# Patient Record
Sex: FEMALE | Race: WHITE | NOT HISPANIC OR LATINO | URBAN - NONMETROPOLITAN AREA
[De-identification: names, ages, dates, MRNs, and addresses within clinical notes are randomized per-mention and may not be internally consistent; named-entity substitution may affect disease eponyms.]

---

## 2021-01-14 ENCOUNTER — IMMUNIZATION (OUTPATIENT)
Dept: VACCINE CLINIC | Facility: HOSPITAL | Age: 66
End: 2021-01-14

## 2021-01-14 PROCEDURE — 0002A: CPT | Performed by: FAMILY MEDICINE

## 2021-01-14 PROCEDURE — 0001A: CPT | Performed by: FAMILY MEDICINE

## 2021-01-14 PROCEDURE — 91300 HC SARSCOV02 VAC 30MCG/0.3ML IM: CPT | Performed by: FAMILY MEDICINE

## 2021-02-04 ENCOUNTER — IMMUNIZATION (OUTPATIENT)
Dept: VACCINE CLINIC | Facility: HOSPITAL | Age: 66
End: 2021-02-04

## 2021-02-04 PROCEDURE — 91300 HC SARSCOV02 VAC 30MCG/0.3ML IM: CPT | Performed by: INTERNAL MEDICINE

## 2021-02-04 PROCEDURE — 0002A: CPT | Performed by: INTERNAL MEDICINE

## 2021-10-05 ENCOUNTER — IMMUNIZATION (OUTPATIENT)
Dept: VACCINE CLINIC | Facility: HOSPITAL | Age: 66
End: 2021-10-05

## 2021-10-05 PROCEDURE — 0003A: CPT | Performed by: INTERNAL MEDICINE

## 2021-10-05 PROCEDURE — 91300 HC SARSCOV02 VAC 30MCG/0.3ML IM: CPT | Performed by: INTERNAL MEDICINE

## 2021-10-05 PROCEDURE — 0004A ADM SARSCOV2 30MCG/0.3ML BOOSTER: CPT | Performed by: INTERNAL MEDICINE

## 2024-10-08 DIAGNOSIS — R10.9 FLANK PAIN: Primary | ICD-10-CM

## 2024-10-10 ENCOUNTER — HOSPITAL ENCOUNTER (OUTPATIENT)
Dept: GENERAL RADIOLOGY | Facility: HOSPITAL | Age: 69
Discharge: HOME | End: 2024-10-10
Admitting: UROLOGY
Payer: MEDICARE

## 2024-10-10 ENCOUNTER — OFFICE VISIT (OUTPATIENT)
Dept: UROLOGY | Facility: CLINIC | Age: 69
End: 2024-10-10
Payer: COMMERCIAL

## 2024-10-10 VITALS
WEIGHT: 139.6 LBS | SYSTOLIC BLOOD PRESSURE: 136 MMHG | BODY MASS INDEX: 27.41 KG/M2 | DIASTOLIC BLOOD PRESSURE: 78 MMHG | HEIGHT: 60 IN | HEART RATE: 66 BPM

## 2024-10-10 DIAGNOSIS — R35.0 FREQUENCY OF URINATION: ICD-10-CM

## 2024-10-10 DIAGNOSIS — R10.9 FLANK PAIN: ICD-10-CM

## 2024-10-10 DIAGNOSIS — R11.0 NAUSEA WITHOUT VOMITING: ICD-10-CM

## 2024-10-10 DIAGNOSIS — N20.0 RENAL CALCULUS, RIGHT: Primary | ICD-10-CM

## 2024-10-10 DIAGNOSIS — R10.9 RIGHT FLANK PAIN: Primary | ICD-10-CM

## 2024-10-10 DIAGNOSIS — R10.30 LOWER ABDOMINAL PAIN: ICD-10-CM

## 2024-10-10 DIAGNOSIS — R10.9 RIGHT FLANK PAIN: ICD-10-CM

## 2024-10-10 DIAGNOSIS — K59.04 CHRONIC IDIOPATHIC CONSTIPATION: ICD-10-CM

## 2024-10-10 LAB
BILIRUB BLD-MCNC: NEGATIVE MG/DL
CLARITY, POC: CLEAR
COLOR UR: YELLOW
EXPIRATION DATE: NORMAL
GLUCOSE UR STRIP-MCNC: NEGATIVE MG/DL
KETONES UR QL: NEGATIVE
LEUKOCYTE EST, POC: NEGATIVE
Lab: NORMAL
NITRITE UR-MCNC: NEGATIVE MG/ML
PH UR: 6 [PH] (ref 5–8)
PROT UR STRIP-MCNC: NEGATIVE MG/DL
RBC # UR STRIP: NEGATIVE /UL
SP GR UR: 1.01 (ref 1–1.03)
UROBILINOGEN UR QL: NORMAL

## 2024-10-10 PROCEDURE — 99204 OFFICE O/P NEW MOD 45 MIN: CPT | Performed by: NURSE PRACTITIONER

## 2024-10-10 PROCEDURE — 74018 RADEX ABDOMEN 1 VIEW: CPT

## 2024-10-10 PROCEDURE — 81003 URINALYSIS AUTO W/O SCOPE: CPT | Performed by: NURSE PRACTITIONER

## 2024-10-10 RX ORDER — EVOLOCUMAB 140 MG/ML
140 INJECTION, SOLUTION SUBCUTANEOUS
COMMUNITY
Start: 2024-08-01

## 2024-10-10 RX ORDER — HYDROCHLOROTHIAZIDE 25 MG/1
25 TABLET ORAL DAILY
COMMUNITY
Start: 2024-07-15

## 2024-10-10 RX ORDER — KETOROLAC TROMETHAMINE 10 MG/1
10 TABLET, FILM COATED ORAL EVERY 6 HOURS PRN
Qty: 20 TABLET | Refills: 0 | Status: SHIPPED | OUTPATIENT
Start: 2024-10-10

## 2024-10-10 RX ORDER — TAMSULOSIN HYDROCHLORIDE 0.4 MG/1
1 CAPSULE ORAL DAILY
Qty: 30 CAPSULE | Refills: 5 | Status: SHIPPED | OUTPATIENT
Start: 2024-10-10

## 2024-10-10 RX ORDER — LEVOTHYROXINE SODIUM 25 UG/1
25 TABLET ORAL DAILY
COMMUNITY
Start: 2024-09-19

## 2024-10-10 RX ORDER — METOPROLOL SUCCINATE 50 MG/1
50 TABLET, EXTENDED RELEASE ORAL DAILY
COMMUNITY
Start: 2024-03-19 | End: 2025-03-19

## 2024-10-10 RX ORDER — ONDANSETRON 4 MG/1
4 TABLET, FILM COATED ORAL EVERY 12 HOURS PRN
Qty: 20 TABLET | Refills: 0 | Status: SHIPPED | OUTPATIENT
Start: 2024-10-10

## 2024-10-10 NOTE — H&P (VIEW-ONLY)
Chief Complaint  RIGHT RENAL STONE/FLANK/BACK/ABD PAIN /IBS-C (NEW PATIENT)    Subjective          Liat Mcginnis presents to [facility] for RIGHT RENAL STONE/FLANK/BACK/ABD PAIN /IBS-C  History of Present Illness   History of Present Illness  The patient is a pleasant 68-year-old female  presenting today for evaluation as a new patient consult.  She has been referred to clinic by PCP with concerns of right renal stone lower back and flank pain.  She is also here for an ED follow-up, initially evaluated on 09/11/2024 where she had a CT abdomen and pelvis completed showing a 7 mm intrarenal stone with left parapelvic renal cyst..     On clinic evaluation today, she is in no apparent discomfort. Nevertheless, patient reports that she has been diagnosed with a large kidney stone, her first occurrence. She has been taking calcium supplements for approximately 7 years and hydrochlorothiazide for a couple of years. She experiences occasional lower back pain and had an episode of frequent urination every 15 minutes for about a week, which has since improved. She reports difficulty in fully emptying her bladder and has not noticed any blood in her urine. She has not undergone any bladder surgeries or experienced abnormal vaginal bleeding. Her last pelvic exam was in 08/2023.  Urinalysis in clinic today is complete negative for leukocyte esterase, it is negative for nitrites, it is negative for gross/microscopic hematuria.  Her PVR is 0 mL.    She is not on any blood thinners but started a new medication this summer for her heart and recent cardiac dysrhythmia. She has experienced episodes of bigeminy and has moderate aortic vascular calcifications. She takes Repatha for cholesterol management and maintains a healthy diet. She has never taken medication until she turned 64. She experiences nausea and abdominal pain but does not take any medication for the nausea.    She has  been experiencing constipation and has been advised to take fiber gummies. It has been some time since her last colonoscopy. Despite her constipation, she maintains a good appetite and eats 2 to 3 meals a day. She has a scheduled appointment with a gastroenterologist.    She had knee surgery in the past. Her knee kept buckling after the surgery despite doing all the recommended exercises and being careful. One day, while cooking, she turned quickly and her knee buckled, causing her to fall. An older gentleman provided immediate assistance by giving her a shot, straightening her knee, and putting it in a brace. She then went to  and had surgery the next morning, where she received a monique. She exercises and walks regularly. Yesterday, she trimmed shrubs and did various other activities. She was off all week and was trying to get things done. She wishes she had never had knee surgery, which was due to an old sports injury.    SOCIAL HISTORY  She is a retired teacher.    FAMILY HISTORY  She denies any family history of colon problems, colon cancer, bladder cancer, or uterine cancers. Her mother, father, and all her brothers have high cholesterol.    Also recommend follow-up with OB/GYN with pelvic/transvaginal ultrasound secondary to calcification in pelvis consistent with uterine fibroid    FINDINGS:XR Abdomen KUB 10/10/24  1. View of the abdomen .Moderate to large stool burden  Large calcification projecting over the right kidney.Calcification in the pelvis, likely uterine calcification.    CT Abdomen With & Without Contrast 09/11/2024  1. Nonobstructing 7 mm right renal pelvis stone.There are no stones in the left kidney. There are no ureteral stones   2. There is no intrarenal stone or hydronephrosis.  3. There are parapelvic cysts involving the left kidney.  4. There is no solid renal mass.  5. Small hiatal hernia.  There is a 5 PMHx as noted in chart  Active Ambulatory Problems     Diagnosis Date Noted    Renal  "calculus, right 10/10/2024    Right flank pain 10/10/2024    Frequency of urination 10/10/2024    Lower abdominal pain 10/10/2024    Chronic idiopathic constipation 10/10/2024    Nausea without vomiting 10/10/2024     Resolved Ambulatory Problems     Diagnosis Date Noted    No Resolved Ambulatory Problems     No Additional Past Medical History      Objective   Vital Signs:   /78 (BP Location: Left arm, Patient Position: Sitting, Cuff Size: Adult)   Pulse 66   Ht 152.4 cm (60\")   Wt 63.3 kg (139 lb 9.6 oz)   BMI 27.26 kg/m²       ROS:   Review of Systems   Constitutional:  Positive for appetite change and fatigue. Negative for activity change, chills, diaphoresis, fever, unexpected weight gain and unexpected weight loss.   HENT: Negative.  Negative for congestion.    Eyes: Negative.  Negative for blurred vision, double vision, photophobia, pain, redness and visual disturbance.   Respiratory: Negative.  Negative for apnea, cough, chest tightness, shortness of breath and wheezing.    Cardiovascular: Negative.    Gastrointestinal:  Positive for abdominal distention, abdominal pain and constipation. Negative for diarrhea, nausea and vomiting.   Endocrine: Negative.  Negative for polydipsia, polyphagia and polyuria.   Genitourinary:  Positive for difficulty urinating, dysuria, flank pain, frequency, pelvic pain, pelvic pressure and urgency. Negative for decreased urine volume, dyspareunia, genital sores, hematuria, urinary incontinence and vaginal discharge.   Musculoskeletal:  Positive for back pain and joint swelling (Post recent knee surgery). Negative for arthralgias.   Skin:  Positive for dry skin and pallor. Negative for rash and wound.   Allergic/Immunologic: Negative.    Neurological: Negative.  Negative for dizziness, headache, memory problem and confusion.   Hematological: Negative.    Psychiatric/Behavioral:  Positive for sleep disturbance and stress. Negative for behavioral problems and decreased " concentration.         Physical Exam  Constitutional:       General: She is in acute distress.      Appearance: She is well-developed. She is obese.   HENT:      Head: Normocephalic and atraumatic.   Eyes:      Pupils: Pupils are equal, round, and reactive to light.   Neck:      Thyroid: No thyromegaly.      Trachea: No tracheal deviation.   Cardiovascular:      Rate and Rhythm: Normal rate and regular rhythm.      Heart sounds: No murmur heard.  Pulmonary:      Effort: Pulmonary effort is normal. No respiratory distress.      Breath sounds: Normal breath sounds. No stridor. No wheezing.   Abdominal:      General: Bowel sounds are normal. There is distension.      Palpations: Abdomen is soft.      Tenderness: There is abdominal tenderness. There is guarding.   Genitourinary:     Labia:         Right: No tenderness.         Left: No tenderness.       Vagina: Normal. No vaginal discharge.      Comments: Detrusor instability to urine frequency, urgency without any incontinence  Musculoskeletal:         General: Tenderness present. No deformity. Normal range of motion.      Cervical back: Normal range of motion.   Skin:     General: Skin is warm and dry.      Capillary Refill: Capillary refill takes less than 2 seconds.      Coloration: Skin is pale.      Findings: No erythema or rash.   Neurological:      Mental Status: She is alert and oriented to person, place, and time.      Cranial Nerves: No cranial nerve deficit.      Sensory: No sensory deficit.      Motor: Weakness present.      Coordination: Coordination normal.      Gait: Gait abnormal.   Psychiatric:         Behavior: Behavior normal.         Thought Content: Thought content normal.         Judgment: Judgment normal.        Physical Exam    Result Review :               10/10/2024    11:24   Urinalysis   Ketones, UA Negative    Leukocytes, UA Negative               Assessment and Plan    Problem List Items Addressed This Visit          Gastrointestinal  Abdominal     Right flank pain    Relevant Medications    ketorolac (TORADOL) 10 MG tablet    tamsulosin (FLOMAX) 0.4 MG capsule 24 hr capsule    Other Relevant Orders    Case Request (Completed)    Lower abdominal pain    Relevant Medications    linaclotide (Linzess) 145 MCG capsule capsule    magnesium citrate solution    ondansetron (Zofran) 4 MG tablet    ketorolac (TORADOL) 10 MG tablet    tamsulosin (FLOMAX) 0.4 MG capsule 24 hr capsule    Other Relevant Orders    Case Request (Completed)    Chronic idiopathic constipation    Relevant Medications    linaclotide (Linzess) 145 MCG capsule capsule    magnesium citrate solution    ondansetron (Zofran) 4 MG tablet    Nausea without vomiting    Relevant Medications    ondansetron (Zofran) 4 MG tablet       Genitourinary and Reproductive     Renal calculus, right - Primary    Relevant Medications    hydroCHLOROthiazide 25 MG tablet    ondansetron (Zofran) 4 MG tablet    ketorolac (TORADOL) 10 MG tablet    tamsulosin (FLOMAX) 0.4 MG capsule 24 hr capsule    Other Relevant Orders    Case Request (Completed)    Frequency of urination    Relevant Orders    POC Urinalysis Dipstick, Automated (Completed)     Assessment & Plan      ASSESSMENT  RIGHT RENAL CALCULUS/FLANK/BACK/ABD PAIN/ACUTE CYSTITIS/IBS-C  Ms. Liat Mcginnis is a pleasant 68-year-old female who presents to clinic for evaluation with diagnosis of 7 mm right renal stone.  She does report persistent flank and abdominal pain with urinary symptoms of frequency urgency and dysuria, pelvic pressure and suprapubic discomfort that has been ongoing and recently becoming very bothersome to her.  She does not have recurrent UTIs and denies any bouts of gross hematuria.  Denies she is in clinic today is completely negative.  Repeat KUB does show 7 mm stone in situ, also moderate to large volume stool consistent with chronic idiopathic constipation.     The Patient has been diagnosed with 7 mm right enal calculus. We have  discussed the various parameters regarding spontaneous passage including the notion that a tiny 1-4 mm stone has a high likelihood of spontaneous passage versus a larger stones >7MM like he has being caught up in the upper areas of the urinary tract.   We also discussed the medical management of stone disease and the use of medical expulsive therapy in the form of Flomax. This is used in an off label setting. I also talked about nonoperative management including ambulation and increasing fluids and hot tub as being an effective adjuncts in the treatment of a stone. We discussed the absolute relative indicators for intervention including the presence of sepsis, and pain we cannot control as the primary need for urgent intervention.  We discussed placement of a stent if indicated and the management of the stent as well.       ESWL-the patient is a candidate for extracorporeal shockwave lithotripsy.  We discussed the type of stone.  And the complications associated with the procedure including but not limited to pain in the flank, hematoma, spontaneous renal hemorrhage, and adequate fragmentation of stones.  The need for passage of the stones.  The need for concomitant additional procedures in the range of 24% the risk of a distal fragment in the range of 3% requiring ureteroscopy. THE Patient is desirous of surgical intervention at this time.    IBS- Patient has significant irritable bowel syndrome, characterized by extreme amounts of constipation.  We spoke about the impact of this on bladder function.  We spoke about  its relationship to recurrent urinary tract infection, the abdominal pain, nausea, back pain, flank pain, pelvic pressure and discomfort that she is experiencing.  We discussed the need for increasing p.o. fluid intake to at least 2 to 3 L of water daily, and discussed the physiology of colonic motility as well as use of MiraLAX as a bulk laxative versus the newer class of serotonin uptake blockers  such as Linzess.  We stressed the need for a daily bowel movement and discussed the [place] stool scale at length. We also discussed a referral to the GI nurse practitioner -                                                  PLAN  The patient has been scheduled for Right Extracorporeal Shockwave Lithotripsy -ESWL on Friday,11/01/2024 with Dr. Saini.     GAVE patient a refill of pain medication-Toradol just in case and Nausea medication and Flomax for medical expulsive therapy     ENCOURAGED to continue conservative therapy, with increased ambulation, warm baths/showers, warm compresses to flank as tolerated.      We discussed OTHER treatment options for her flank pain with patient encouraged to continue conservative therapy alternating NSAID/Tylenol as tolerated, Also including hot baths, showers, warm compresses to lower back as tolerated. Also encouraged walking, physical therapy, light exercises as tolerated    Rest is the most important treatment in the case of flank pain. Rest and physical therapy are enough to improve minor pain. Discussed to monitor her daily routine with prevention of flank pain by: At least Drinking 8 glasses of water per day, Limiting your alcohol consumption.  Having a healthy diet containing fruits, vegetables, and a lot of fluids, Practicing safe sex.  Also maintaining proper hygiene of your body as well as the environment.    Samples Linzess 145 mg given to patient for bowel therapy    Also recommend magnesium citrate or daily MiraLAX    Also recommend follow-up with OB/GYN secondary to calcification in pelvis consistent with uterine fibroid    Follow up as discussed post surgery for kidney stone reevaluation.     May return sooner if need be      THE PATIENT IS AGREEABLE WITH PLAN OF CARE.     1. Right renal calculus./FLANK/BACK PAIN-SUMMARY  The patient's symptoms and imaging results are consistent with a diagnosis of right renal calculus. Shockwave lithotripsy is recommended for  the treatment of the renal calculus. An outpatient shockwave lithotripsy procedure has been scheduled for 11/01/2024. Post-procedure pain management will be provided, with Flomax highly recommended to aid in the passage of the stone. Zofran will be prescribed for nausea.    2. Constipation.  The patient's symptoms and history suggest a diagnosis of constipation. Linzess is recommended for the management of constipation. Samples of Linzess will be provided. Lactulose or magnesium citrate is recommended for colon cleansing. She is advised to increase her fiber intake and use MiraLAX at home, mixing three to five scoops and drinking it at nighttime over the weekend.    3. Possible calcified uterine fibroid.  A uterine ultrasound of the pelvis and uterus is recommended to investigate the possibility of a calcified uterine fibroid. She is advised to schedule this ultrasound with her OBGYN.    Patient reports that she is not currently experiencing any symptoms of urinary incontinence.      BMI is >= 25 and <30. (Overweight) The following options were offered after discussion;: weight loss educational material (shared in after visit summary), exercise counseling/recommendations, and nutrition counseling/recommendations      RADIOLOGY (CT AND/OR KUB):    CT Abdomen and Pelvis: No results found for this or any previous visit.     CT Stone Protocol: No results found for this or any previous visit.     KUB: Results for orders placed during the hospital encounter of 10/10/24    XR Abdomen KUB    Narrative  EXAMINATION: XR ABDOMEN KUB-    CLINICAL INDICATION: flank pain; R10.9-Unspecified abdominal pain      COMPARISON: None available    FINDINGS:  1. View of the abdomen    Moderate to large stool burden    Large calcification projecting over the right kidney.    Calcification in the pelvis, likely uterine calcification.      This report was finalized on 10/10/2024 10:28 AM by Dr. Micheal Deleon MD.       [unfilled]  LABS (3  MONTHS):    Office Visit on 10/10/2024   Component Date Value Ref Range Status    Color 10/10/2024 Yellow  Yellow, Straw, Dark Yellow, Cynthia Final    Clarity, UA 10/10/2024 Clear  Clear Final    Specific Gravity  10/10/2024 1.010  1.005 - 1.030 Final    pH, Urine 10/10/2024 6.0  5.0 - 8.0 Final    Leukocytes 10/10/2024 Negative  Negative Final    Nitrite, UA 10/10/2024 Negative  Negative Final    Protein, POC 10/10/2024 Negative  Negative mg/dL Final    Glucose, UA 10/10/2024 Negative  Negative mg/dL Final    Ketones, UA 10/10/2024 Negative  Negative Final    Urobilinogen, UA 10/10/2024 Normal  Normal, 0.2 E.U./dL Final    Bilirubin 10/10/2024 Negative  Negative Final    Blood, UA 10/10/2024 Negative  Negative Final    Lot Number 10/10/2024 [id number]   Final    Expiration Date 10/10/2024 10/25/2024   Final          Follow Up   Return in about 22 days (around 11/1/2024) for Next scheduled follow up, With Dr. Saini IN OR FOR RIGHT ESWL/FLANK/ABD PAIN.    Patient was given instructions and counseling regarding her condition or for health maintenance advice. Please see specific information pulled into the AVS if appropriate.          This document has been electronically signed by Griselda Cheng-Akwa, APRN   October 10, 2024 16:07 EDT      Dictated Utilizing Dragon Dictation: Part of this note may be an electronic transcription/translation of spoken language to printed text using the Dragon Dictation System.      Patient or patient representative verbalized consent for the use of Ambient Listening during the visit with  Griselda Cheng-Akwa, APRN for chart documentation. 10/10/2024  15:59 EDT

## 2024-10-10 NOTE — PROGRESS NOTES
Chief Complaint  RIGHT RENAL STONE/FLANK/BACK/ABD PAIN /IBS-C (NEW PATIENT)    Subjective          Liat Mcginnis presents to [facility] for RIGHT RENAL STONE/FLANK/BACK/ABD PAIN /IBS-C  History of Present Illness   History of Present Illness  The patient is a pleasant 68-year-old female  presenting today for evaluation as a new patient consult.  She has been referred to clinic by PCP with concerns of right renal stone lower back and flank pain.  She is also here for an ED follow-up, initially evaluated on 09/11/2024 where she had a CT abdomen and pelvis completed showing a 7 mm intrarenal stone with left parapelvic renal cyst..     On clinic evaluation today, she is in no apparent discomfort. Nevertheless, patient reports that she has been diagnosed with a large kidney stone, her first occurrence. She has been taking calcium supplements for approximately 7 years and hydrochlorothiazide for a couple of years. She experiences occasional lower back pain and had an episode of frequent urination every 15 minutes for about a week, which has since improved. She reports difficulty in fully emptying her bladder and has not noticed any blood in her urine. She has not undergone any bladder surgeries or experienced abnormal vaginal bleeding. Her last pelvic exam was in 08/2023.  Urinalysis in clinic today is complete negative for leukocyte esterase, it is negative for nitrites, it is negative for gross/microscopic hematuria.  Her PVR is 0 mL.    She is not on any blood thinners but started a new medication this summer for her heart and recent cardiac dysrhythmia. She has experienced episodes of bigeminy and has moderate aortic vascular calcifications. She takes Repatha for cholesterol management and maintains a healthy diet. She has never taken medication until she turned 64. She experiences nausea and abdominal pain but does not take any medication for the nausea.    She has  been experiencing constipation and has been advised to take fiber gummies. It has been some time since her last colonoscopy. Despite her constipation, she maintains a good appetite and eats 2 to 3 meals a day. She has a scheduled appointment with a gastroenterologist.    She had knee surgery in the past. Her knee kept buckling after the surgery despite doing all the recommended exercises and being careful. One day, while cooking, she turned quickly and her knee buckled, causing her to fall. An older gentleman provided immediate assistance by giving her a shot, straightening her knee, and putting it in a brace. She then went to  and had surgery the next morning, where she received a monique. She exercises and walks regularly. Yesterday, she trimmed shrubs and did various other activities. She was off all week and was trying to get things done. She wishes she had never had knee surgery, which was due to an old sports injury.    SOCIAL HISTORY  She is a retired teacher.    FAMILY HISTORY  She denies any family history of colon problems, colon cancer, bladder cancer, or uterine cancers. Her mother, father, and all her brothers have high cholesterol.    Also recommend follow-up with OB/GYN with pelvic/transvaginal ultrasound secondary to calcification in pelvis consistent with uterine fibroid    FINDINGS:XR Abdomen KUB 10/10/24  1. View of the abdomen .Moderate to large stool burden  Large calcification projecting over the right kidney.Calcification in the pelvis, likely uterine calcification.    CT Abdomen With & Without Contrast 09/11/2024  1. Nonobstructing 7 mm right renal pelvis stone.There are no stones in the left kidney. There are no ureteral stones   2. There is no intrarenal stone or hydronephrosis.  3. There are parapelvic cysts involving the left kidney.  4. There is no solid renal mass.  5. Small hiatal hernia.  There is a 5 PMHx as noted in chart  Active Ambulatory Problems     Diagnosis Date Noted    Renal  "calculus, right 10/10/2024    Right flank pain 10/10/2024    Frequency of urination 10/10/2024    Lower abdominal pain 10/10/2024    Chronic idiopathic constipation 10/10/2024    Nausea without vomiting 10/10/2024     Resolved Ambulatory Problems     Diagnosis Date Noted    No Resolved Ambulatory Problems     No Additional Past Medical History      Objective   Vital Signs:   /78 (BP Location: Left arm, Patient Position: Sitting, Cuff Size: Adult)   Pulse 66   Ht 152.4 cm (60\")   Wt 63.3 kg (139 lb 9.6 oz)   BMI 27.26 kg/m²       ROS:   Review of Systems   Constitutional:  Positive for appetite change and fatigue. Negative for activity change, chills, diaphoresis, fever, unexpected weight gain and unexpected weight loss.   HENT: Negative.  Negative for congestion.    Eyes: Negative.  Negative for blurred vision, double vision, photophobia, pain, redness and visual disturbance.   Respiratory: Negative.  Negative for apnea, cough, chest tightness, shortness of breath and wheezing.    Cardiovascular: Negative.    Gastrointestinal:  Positive for abdominal distention, abdominal pain and constipation. Negative for diarrhea, nausea and vomiting.   Endocrine: Negative.  Negative for polydipsia, polyphagia and polyuria.   Genitourinary:  Positive for difficulty urinating, dysuria, flank pain, frequency, pelvic pain, pelvic pressure and urgency. Negative for decreased urine volume, dyspareunia, genital sores, hematuria, urinary incontinence and vaginal discharge.   Musculoskeletal:  Positive for back pain and joint swelling (Post recent knee surgery). Negative for arthralgias.   Skin:  Positive for dry skin and pallor. Negative for rash and wound.   Allergic/Immunologic: Negative.    Neurological: Negative.  Negative for dizziness, headache, memory problem and confusion.   Hematological: Negative.    Psychiatric/Behavioral:  Positive for sleep disturbance and stress. Negative for behavioral problems and decreased " concentration.         Physical Exam  Constitutional:       General: She is in acute distress.      Appearance: She is well-developed. She is obese.   HENT:      Head: Normocephalic and atraumatic.   Eyes:      Pupils: Pupils are equal, round, and reactive to light.   Neck:      Thyroid: No thyromegaly.      Trachea: No tracheal deviation.   Cardiovascular:      Rate and Rhythm: Normal rate and regular rhythm.      Heart sounds: No murmur heard.  Pulmonary:      Effort: Pulmonary effort is normal. No respiratory distress.      Breath sounds: Normal breath sounds. No stridor. No wheezing.   Abdominal:      General: Bowel sounds are normal. There is distension.      Palpations: Abdomen is soft.      Tenderness: There is abdominal tenderness. There is guarding.   Genitourinary:     Labia:         Right: No tenderness.         Left: No tenderness.       Vagina: Normal. No vaginal discharge.      Comments: Detrusor instability to urine frequency, urgency without any incontinence  Musculoskeletal:         General: Tenderness present. No deformity. Normal range of motion.      Cervical back: Normal range of motion.   Skin:     General: Skin is warm and dry.      Capillary Refill: Capillary refill takes less than 2 seconds.      Coloration: Skin is pale.      Findings: No erythema or rash.   Neurological:      Mental Status: She is alert and oriented to person, place, and time.      Cranial Nerves: No cranial nerve deficit.      Sensory: No sensory deficit.      Motor: Weakness present.      Coordination: Coordination normal.      Gait: Gait abnormal.   Psychiatric:         Behavior: Behavior normal.         Thought Content: Thought content normal.         Judgment: Judgment normal.        Physical Exam    Result Review :               10/10/2024    11:24   Urinalysis   Ketones, UA Negative    Leukocytes, UA Negative               Assessment and Plan    Problem List Items Addressed This Visit          Gastrointestinal  Abdominal     Right flank pain    Relevant Medications    ketorolac (TORADOL) 10 MG tablet    tamsulosin (FLOMAX) 0.4 MG capsule 24 hr capsule    Other Relevant Orders    Case Request (Completed)    Lower abdominal pain    Relevant Medications    linaclotide (Linzess) 145 MCG capsule capsule    magnesium citrate solution    ondansetron (Zofran) 4 MG tablet    ketorolac (TORADOL) 10 MG tablet    tamsulosin (FLOMAX) 0.4 MG capsule 24 hr capsule    Other Relevant Orders    Case Request (Completed)    Chronic idiopathic constipation    Relevant Medications    linaclotide (Linzess) 145 MCG capsule capsule    magnesium citrate solution    ondansetron (Zofran) 4 MG tablet    Nausea without vomiting    Relevant Medications    ondansetron (Zofran) 4 MG tablet       Genitourinary and Reproductive     Renal calculus, right - Primary    Relevant Medications    hydroCHLOROthiazide 25 MG tablet    ondansetron (Zofran) 4 MG tablet    ketorolac (TORADOL) 10 MG tablet    tamsulosin (FLOMAX) 0.4 MG capsule 24 hr capsule    Other Relevant Orders    Case Request (Completed)    Frequency of urination    Relevant Orders    POC Urinalysis Dipstick, Automated (Completed)     Assessment & Plan      ASSESSMENT  RIGHT RENAL CALCULUS/FLANK/BACK/ABD PAIN/ACUTE CYSTITIS/IBS-C  Ms. Liat Mcginnis is a pleasant 68-year-old female who presents to clinic for evaluation with diagnosis of 7 mm right renal stone.  She does report persistent flank and abdominal pain with urinary symptoms of frequency urgency and dysuria, pelvic pressure and suprapubic discomfort that has been ongoing and recently becoming very bothersome to her.  She does not have recurrent UTIs and denies any bouts of gross hematuria.  Denies she is in clinic today is completely negative.  Repeat KUB does show 7 mm stone in situ, also moderate to large volume stool consistent with chronic idiopathic constipation.     The Patient has been diagnosed with 7 mm right enal calculus. We have  discussed the various parameters regarding spontaneous passage including the notion that a tiny 1-4 mm stone has a high likelihood of spontaneous passage versus a larger stones >7MM like he has being caught up in the upper areas of the urinary tract.   We also discussed the medical management of stone disease and the use of medical expulsive therapy in the form of Flomax. This is used in an off label setting. I also talked about nonoperative management including ambulation and increasing fluids and hot tub as being an effective adjuncts in the treatment of a stone. We discussed the absolute relative indicators for intervention including the presence of sepsis, and pain we cannot control as the primary need for urgent intervention.  We discussed placement of a stent if indicated and the management of the stent as well.       ESWL-the patient is a candidate for extracorporeal shockwave lithotripsy.  We discussed the type of stone.  And the complications associated with the procedure including but not limited to pain in the flank, hematoma, spontaneous renal hemorrhage, and adequate fragmentation of stones.  The need for passage of the stones.  The need for concomitant additional procedures in the range of 24% the risk of a distal fragment in the range of 3% requiring ureteroscopy. THE Patient is desirous of surgical intervention at this time.    IBS- Patient has significant irritable bowel syndrome, characterized by extreme amounts of constipation.  We spoke about the impact of this on bladder function.  We spoke about  its relationship to recurrent urinary tract infection, the abdominal pain, nausea, back pain, flank pain, pelvic pressure and discomfort that she is experiencing.  We discussed the need for increasing p.o. fluid intake to at least 2 to 3 L of water daily, and discussed the physiology of colonic motility as well as use of MiraLAX as a bulk laxative versus the newer class of serotonin uptake blockers  such as Linzess.  We stressed the need for a daily bowel movement and discussed the [place] stool scale at length. We also discussed a referral to the GI nurse practitioner -                                                  PLAN  The patient has been scheduled for Right Extracorporeal Shockwave Lithotripsy -ESWL on Friday,11/01/2024 with Dr. Saini.     GAVE patient a refill of pain medication-Toradol just in case and Nausea medication and Flomax for medical expulsive therapy     ENCOURAGED to continue conservative therapy, with increased ambulation, warm baths/showers, warm compresses to flank as tolerated.      We discussed OTHER treatment options for her flank pain with patient encouraged to continue conservative therapy alternating NSAID/Tylenol as tolerated, Also including hot baths, showers, warm compresses to lower back as tolerated. Also encouraged walking, physical therapy, light exercises as tolerated    Rest is the most important treatment in the case of flank pain. Rest and physical therapy are enough to improve minor pain. Discussed to monitor her daily routine with prevention of flank pain by: At least Drinking 8 glasses of water per day, Limiting your alcohol consumption.  Having a healthy diet containing fruits, vegetables, and a lot of fluids, Practicing safe sex.  Also maintaining proper hygiene of your body as well as the environment.    Samples Linzess 145 mg given to patient for bowel therapy    Also recommend magnesium citrate or daily MiraLAX    Also recommend follow-up with OB/GYN secondary to calcification in pelvis consistent with uterine fibroid    Follow up as discussed post surgery for kidney stone reevaluation.     May return sooner if need be      THE PATIENT IS AGREEABLE WITH PLAN OF CARE.     1. Right renal calculus./FLANK/BACK PAIN-SUMMARY  The patient's symptoms and imaging results are consistent with a diagnosis of right renal calculus. Shockwave lithotripsy is recommended for  the treatment of the renal calculus. An outpatient shockwave lithotripsy procedure has been scheduled for 11/01/2024. Post-procedure pain management will be provided, with Flomax highly recommended to aid in the passage of the stone. Zofran will be prescribed for nausea.    2. Constipation.  The patient's symptoms and history suggest a diagnosis of constipation. Linzess is recommended for the management of constipation. Samples of Linzess will be provided. Lactulose or magnesium citrate is recommended for colon cleansing. She is advised to increase her fiber intake and use MiraLAX at home, mixing three to five scoops and drinking it at nighttime over the weekend.    3. Possible calcified uterine fibroid.  A uterine ultrasound of the pelvis and uterus is recommended to investigate the possibility of a calcified uterine fibroid. She is advised to schedule this ultrasound with her OBGYN.    Patient reports that she is not currently experiencing any symptoms of urinary incontinence.      BMI is >= 25 and <30. (Overweight) The following options were offered after discussion;: weight loss educational material (shared in after visit summary), exercise counseling/recommendations, and nutrition counseling/recommendations      RADIOLOGY (CT AND/OR KUB):    CT Abdomen and Pelvis: No results found for this or any previous visit.     CT Stone Protocol: No results found for this or any previous visit.     KUB: Results for orders placed during the hospital encounter of 10/10/24    XR Abdomen KUB    Narrative  EXAMINATION: XR ABDOMEN KUB-    CLINICAL INDICATION: flank pain; R10.9-Unspecified abdominal pain      COMPARISON: None available    FINDINGS:  1. View of the abdomen    Moderate to large stool burden    Large calcification projecting over the right kidney.    Calcification in the pelvis, likely uterine calcification.      This report was finalized on 10/10/2024 10:28 AM by Dr. Micheal Deleon MD.       [unfilled]  LABS (3  MONTHS):    Office Visit on 10/10/2024   Component Date Value Ref Range Status    Color 10/10/2024 Yellow  Yellow, Straw, Dark Yellow, Cynthia Final    Clarity, UA 10/10/2024 Clear  Clear Final    Specific Gravity  10/10/2024 1.010  1.005 - 1.030 Final    pH, Urine 10/10/2024 6.0  5.0 - 8.0 Final    Leukocytes 10/10/2024 Negative  Negative Final    Nitrite, UA 10/10/2024 Negative  Negative Final    Protein, POC 10/10/2024 Negative  Negative mg/dL Final    Glucose, UA 10/10/2024 Negative  Negative mg/dL Final    Ketones, UA 10/10/2024 Negative  Negative Final    Urobilinogen, UA 10/10/2024 Normal  Normal, 0.2 E.U./dL Final    Bilirubin 10/10/2024 Negative  Negative Final    Blood, UA 10/10/2024 Negative  Negative Final    Lot Number 10/10/2024 [id number]   Final    Expiration Date 10/10/2024 10/25/2024   Final          Follow Up   Return in about 22 days (around 11/1/2024) for Next scheduled follow up, With Dr. Saini IN OR FOR RIGHT ESWL/FLANK/ABD PAIN.    Patient was given instructions and counseling regarding her condition or for health maintenance advice. Please see specific information pulled into the AVS if appropriate.          This document has been electronically signed by Griselda Cheng-Akwa, APRN   October 10, 2024 16:07 EDT      Dictated Utilizing Dragon Dictation: Part of this note may be an electronic transcription/translation of spoken language to printed text using the Dragon Dictation System.      Patient or patient representative verbalized consent for the use of Ambient Listening during the visit with  Griselda Cheng-Akwa, APRN for chart documentation. 10/10/2024  15:59 EDT

## 2024-10-29 NOTE — DISCHARGE INSTRUCTIONS
Please discontinue all blood thinners and anticoagulants (except aspirin) prior to surgery as per your surgeon and cardiologist instructions.  Aspirin may be continued up to the day prior to surgery.    HOLD all diabetic medications the morning of surgery as order by physician.    Please follow instructions on use of prep cloths provided by nurse. Return instruction sheet to pre-op nurse on day of surgery.    Arrival time for surgery on 11/1/24  will be given to you by 's   Office.    A RESPONSIBLE PERSON MUST REMAIN IN THE WAITING ROOM DURING YOUR PROCEDURE AND A RESPONSIBLE  MUST BE AVAILABLE UPON YOUR DISCHARGE.    General Instructions:  Do NOT eat or drink after midnight 10/31/24 which includes water, mints, or gum.  You may brush your teeth. Dental appliances that are removable must be taken out day of surgery.  Do NOT smoke, chew tobacco, or drink alcohol within 24 hours prior to surgery.  Bring medications in original bottles, any inhalers and if applicable your C-PAP/BI-PAP machine  Bring any papers given to you in the doctor’s office  Wear clean, comfortable clothes and socks  Do NOT wear contact lenses or make-up or dark nail polish.  Bring a case for your glasses if applicable.  Bring crutches or walker if applicable  Leave all other valuables and jewelry at home  If you were given a blood bank armband, continue to wear it until discharged.    Preventing a Surgical Site Infection:  Shower the night before surgery (unless instructed otherwise) using a fresh bar of anti-bacterial soap (such as Dial) and clean washcloth.  Dry with a clean towel and dress in clean clothing.  For 2 to 3 days before surgery, avoid shaving with a razor near where you will have surgery because the razor can irritate skin and make it easier to develop an infection.  Ask your surgeon if you will be receiving antibiotics prior to surgery.  Make sure you, your family, and all healthcare providers clean their hands  with soap and water or an alcohol-based hand  before caring for you or your wound.  If at all possible, quit smoking as many days before surgery as you can.    Day of Surgery:  Upon arrival, a pre-op nurse and anesthesiologist will review your health history, obtain vital signs, and answer questions you may have.  The only belongings needed at this time will be your home medications and if applicable you C-PAP/BI-PAP machine.  If you are staying overnight, your family can leave the rest of your belongings in the car and bring them to your room later.  A pre-op nurse will start an IV and you may receive medication in preparation for surgery.  Due to patient privacy and limited space, only one member of your family will be able to accompany you in the pre-op area.  While you are in surgery your family should notify the waiting room  if they leave the waiting room area and provide a contact number.  Please be aware that surgery does come with discomfort.  We want to make every effort to control your discomfort so please discuss any uncontrolled symptoms with your nurse.  Your doctor will most likely have prescribed pain medications.  If you are going home after surgery you will receive individualized written care instructions before being discharged.  A responsible adult must drive you to and from the hospital on the day of surgery and stay with you for 24 hours.  If you are staying overnight following surgery, you will be transported to your hospital room following the recovery period.

## 2024-10-30 ENCOUNTER — PRE-ADMISSION TESTING (OUTPATIENT)
Dept: PREADMISSION TESTING | Facility: HOSPITAL | Age: 69
End: 2024-10-30
Payer: COMMERCIAL

## 2024-10-30 LAB
ANION GAP SERPL CALCULATED.3IONS-SCNC: 10.9 MMOL/L (ref 5–15)
BUN SERPL-MCNC: 19 MG/DL (ref 8–23)
BUN/CREAT SERPL: 26 (ref 7–25)
CALCIUM SPEC-SCNC: 9.6 MG/DL (ref 8.6–10.5)
CHLORIDE SERPL-SCNC: 101 MMOL/L (ref 98–107)
CO2 SERPL-SCNC: 29.1 MMOL/L (ref 22–29)
CREAT SERPL-MCNC: 0.73 MG/DL (ref 0.57–1)
DEPRECATED RDW RBC AUTO: 43.5 FL (ref 37–54)
EGFRCR SERPLBLD CKD-EPI 2021: 89.2 ML/MIN/1.73
ERYTHROCYTE [DISTWIDTH] IN BLOOD BY AUTOMATED COUNT: 13.3 % (ref 12.3–15.4)
GLUCOSE SERPL-MCNC: 78 MG/DL (ref 65–99)
HCT VFR BLD AUTO: 44.4 % (ref 34–46.6)
HGB BLD-MCNC: 14.2 G/DL (ref 12–15.9)
MCH RBC QN AUTO: 28.5 PG (ref 26.6–33)
MCHC RBC AUTO-ENTMCNC: 32 G/DL (ref 31.5–35.7)
MCV RBC AUTO: 89 FL (ref 79–97)
PLATELET # BLD AUTO: 293 10*3/MM3 (ref 140–450)
PMV BLD AUTO: 10.4 FL (ref 6–12)
POTASSIUM SERPL-SCNC: 3.5 MMOL/L (ref 3.5–5.2)
RBC # BLD AUTO: 4.99 10*6/MM3 (ref 3.77–5.28)
SODIUM SERPL-SCNC: 141 MMOL/L (ref 136–145)
WBC NRBC COR # BLD AUTO: 6.37 10*3/MM3 (ref 3.4–10.8)

## 2024-10-30 PROCEDURE — 80048 BASIC METABOLIC PNL TOTAL CA: CPT

## 2024-10-30 PROCEDURE — 85027 COMPLETE CBC AUTOMATED: CPT

## 2024-10-30 PROCEDURE — 36415 COLL VENOUS BLD VENIPUNCTURE: CPT

## 2024-11-01 ENCOUNTER — ANESTHESIA EVENT (OUTPATIENT)
Dept: PERIOP | Facility: HOSPITAL | Age: 69
End: 2024-11-01
Payer: COMMERCIAL

## 2024-11-01 ENCOUNTER — ANESTHESIA (OUTPATIENT)
Dept: PERIOP | Facility: HOSPITAL | Age: 69
End: 2024-11-01
Payer: COMMERCIAL

## 2024-11-01 ENCOUNTER — HOSPITAL ENCOUNTER (OUTPATIENT)
Facility: HOSPITAL | Age: 69
Setting detail: HOSPITAL OUTPATIENT SURGERY
Discharge: HOME | End: 2024-11-01
Attending: UROLOGY | Admitting: UROLOGY
Payer: COMMERCIAL

## 2024-11-01 VITALS
OXYGEN SATURATION: 97 % | BODY MASS INDEX: 27.09 KG/M2 | HEIGHT: 60 IN | WEIGHT: 138 LBS | SYSTOLIC BLOOD PRESSURE: 132 MMHG | RESPIRATION RATE: 18 BRPM | HEART RATE: 66 BPM | TEMPERATURE: 98 F | DIASTOLIC BLOOD PRESSURE: 74 MMHG

## 2024-11-01 DIAGNOSIS — R10.30 LOWER ABDOMINAL PAIN: ICD-10-CM

## 2024-11-01 DIAGNOSIS — N20.0 RENAL CALCULUS, RIGHT: ICD-10-CM

## 2024-11-01 DIAGNOSIS — R10.9 RIGHT FLANK PAIN: ICD-10-CM

## 2024-11-01 LAB
QT INTERVAL: 456 MS
QTC INTERVAL: 459 MS

## 2024-11-01 PROCEDURE — 25010000002 DEXAMETHASONE PER 1 MG: Performed by: NURSE ANESTHETIST, CERTIFIED REGISTERED

## 2024-11-01 PROCEDURE — 25010000002 PROPOFOL 200 MG/20ML EMULSION: Performed by: NURSE ANESTHETIST, CERTIFIED REGISTERED

## 2024-11-01 PROCEDURE — 25010000002 FENTANYL CITRATE (PF) 50 MCG/ML SOLUTION: Performed by: NURSE ANESTHETIST, CERTIFIED REGISTERED

## 2024-11-01 PROCEDURE — 25010000002 ONDANSETRON PER 1 MG: Performed by: NURSE ANESTHETIST, CERTIFIED REGISTERED

## 2024-11-01 PROCEDURE — 50590 FRAGMENTING OF KIDNEY STONE: CPT | Performed by: UROLOGY

## 2024-11-01 PROCEDURE — 25010000002 GENTAMICIN PER 80 MG: Performed by: UROLOGY

## 2024-11-01 PROCEDURE — 93005 ELECTROCARDIOGRAM TRACING: CPT | Performed by: ANESTHESIOLOGY

## 2024-11-01 RX ORDER — FAMOTIDINE 10 MG/ML
INJECTION, SOLUTION INTRAVENOUS AS NEEDED
Status: DISCONTINUED | OUTPATIENT
Start: 2024-11-01 | End: 2024-11-01 | Stop reason: SURG

## 2024-11-01 RX ORDER — ONDANSETRON 2 MG/ML
4 INJECTION INTRAMUSCULAR; INTRAVENOUS AS NEEDED
Status: DISCONTINUED | OUTPATIENT
Start: 2024-11-01 | End: 2024-11-01 | Stop reason: HOSPADM

## 2024-11-01 RX ORDER — HYDROCODONE BITARTRATE AND ACETAMINOPHEN 10; 325 MG/1; MG/1
1 TABLET ORAL EVERY 6 HOURS PRN
Qty: 12 TABLET | Refills: 0 | Status: SHIPPED | OUTPATIENT
Start: 2024-11-01

## 2024-11-01 RX ORDER — IPRATROPIUM BROMIDE AND ALBUTEROL SULFATE 2.5; .5 MG/3ML; MG/3ML
3 SOLUTION RESPIRATORY (INHALATION) ONCE AS NEEDED
Status: DISCONTINUED | OUTPATIENT
Start: 2024-11-01 | End: 2024-11-01 | Stop reason: HOSPADM

## 2024-11-01 RX ORDER — MIDAZOLAM HYDROCHLORIDE 1 MG/ML
0.5 INJECTION, SOLUTION INTRAMUSCULAR; INTRAVENOUS
Status: DISCONTINUED | OUTPATIENT
Start: 2024-11-01 | End: 2024-11-01 | Stop reason: HOSPADM

## 2024-11-01 RX ORDER — SODIUM CHLORIDE 9 MG/ML
40 INJECTION, SOLUTION INTRAVENOUS AS NEEDED
Status: DISCONTINUED | OUTPATIENT
Start: 2024-11-01 | End: 2024-11-01 | Stop reason: HOSPADM

## 2024-11-01 RX ORDER — DEXAMETHASONE SODIUM PHOSPHATE 4 MG/ML
INJECTION, SOLUTION INTRA-ARTICULAR; INTRALESIONAL; INTRAMUSCULAR; INTRAVENOUS; SOFT TISSUE AS NEEDED
Status: DISCONTINUED | OUTPATIENT
Start: 2024-11-01 | End: 2024-11-01 | Stop reason: SURG

## 2024-11-01 RX ORDER — FENTANYL CITRATE 50 UG/ML
50 INJECTION, SOLUTION INTRAMUSCULAR; INTRAVENOUS
Status: DISCONTINUED | OUTPATIENT
Start: 2024-11-01 | End: 2024-11-01 | Stop reason: HOSPADM

## 2024-11-01 RX ORDER — FENTANYL CITRATE 50 UG/ML
INJECTION, SOLUTION INTRAMUSCULAR; INTRAVENOUS AS NEEDED
Status: DISCONTINUED | OUTPATIENT
Start: 2024-11-01 | End: 2024-11-01 | Stop reason: SURG

## 2024-11-01 RX ORDER — PROPOFOL 10 MG/ML
INJECTION, EMULSION INTRAVENOUS AS NEEDED
Status: DISCONTINUED | OUTPATIENT
Start: 2024-11-01 | End: 2024-11-01 | Stop reason: SURG

## 2024-11-01 RX ORDER — SODIUM CHLORIDE 0.9 % (FLUSH) 0.9 %
10 SYRINGE (ML) INJECTION EVERY 12 HOURS SCHEDULED
Status: DISCONTINUED | OUTPATIENT
Start: 2024-11-01 | End: 2024-11-01 | Stop reason: HOSPADM

## 2024-11-01 RX ORDER — SODIUM CHLORIDE, SODIUM LACTATE, POTASSIUM CHLORIDE, CALCIUM CHLORIDE 600; 310; 30; 20 MG/100ML; MG/100ML; MG/100ML; MG/100ML
125 INJECTION, SOLUTION INTRAVENOUS ONCE
Status: DISCONTINUED | OUTPATIENT
Start: 2024-11-01 | End: 2024-11-01 | Stop reason: HOSPADM

## 2024-11-01 RX ORDER — ONDANSETRON 2 MG/ML
INJECTION INTRAMUSCULAR; INTRAVENOUS AS NEEDED
Status: DISCONTINUED | OUTPATIENT
Start: 2024-11-01 | End: 2024-11-01 | Stop reason: SURG

## 2024-11-01 RX ORDER — OXYCODONE AND ACETAMINOPHEN 5; 325 MG/1; MG/1
1 TABLET ORAL ONCE AS NEEDED
Status: DISCONTINUED | OUTPATIENT
Start: 2024-11-01 | End: 2024-11-01 | Stop reason: HOSPADM

## 2024-11-01 RX ORDER — GENTAMICIN SULFATE 80 MG/100ML
80 INJECTION, SOLUTION INTRAVENOUS ONCE
Status: COMPLETED | OUTPATIENT
Start: 2024-11-01 | End: 2024-11-01

## 2024-11-01 RX ORDER — SODIUM CHLORIDE 0.9 % (FLUSH) 0.9 %
10 SYRINGE (ML) INJECTION AS NEEDED
Status: DISCONTINUED | OUTPATIENT
Start: 2024-11-01 | End: 2024-11-01 | Stop reason: HOSPADM

## 2024-11-01 RX ORDER — MEPERIDINE HYDROCHLORIDE 25 MG/ML
12.5 INJECTION INTRAMUSCULAR; INTRAVENOUS; SUBCUTANEOUS
Status: DISCONTINUED | OUTPATIENT
Start: 2024-11-01 | End: 2024-11-01 | Stop reason: HOSPADM

## 2024-11-01 RX ADMIN — DEXAMETHASONE SODIUM PHOSPHATE 4 MG: 4 INJECTION, SOLUTION INTRA-ARTICULAR; INTRALESIONAL; INTRAMUSCULAR; INTRAVENOUS; SOFT TISSUE at 08:43

## 2024-11-01 RX ADMIN — ONDANSETRON 4 MG: 2 INJECTION INTRAMUSCULAR; INTRAVENOUS at 08:43

## 2024-11-01 RX ADMIN — PROPOFOL 120 MG: 10 INJECTION, EMULSION INTRAVENOUS at 08:46

## 2024-11-01 RX ADMIN — GENTAMICIN SULFATE 80 MG: 80 INJECTION, SOLUTION INTRAVENOUS at 08:43

## 2024-11-01 RX ADMIN — FAMOTIDINE 20 MG: 10 INJECTION, SOLUTION INTRAVENOUS at 08:43

## 2024-11-01 RX ADMIN — FENTANYL CITRATE 100 MCG: 50 INJECTION INTRAMUSCULAR; INTRAVENOUS at 08:43

## 2024-11-01 NOTE — OP NOTE
EXTRACORPOREAL SHOCKWAVE LITHOTRIPSY  Procedure Note    Liat Mcginnis  11/1/2024    Pre-op Diagnosis:   Renal calculus, right [N20.0]  Right flank pain [R10.9]  Lower abdominal pain [R10.30]    Post-op Diagnosis:     Post-Op Diagnosis Codes:     * Renal calculus, right [N20.0]     * Right flank pain [R10.9]     * Lower abdominal pain [R10.30]    Procedure/CPT® Codes:  68-year-old white female with a painful 7 mm UPJ stone for lithotripsy.  ESWL-the patient is a candidate for extracorporeal shockwave lithotripsy.  We discussed the type of stone and the complications associated with the procedure including, but not limited to, pain in the flank, hematoma, spontaneous renal hemorrhage, inadequate fragmentation of stones, the need for passage of the stones, the need for concomitant additional procedures in the range of 24%, the risk of a distal fragment in the range of 3% requiring ureteroscopic removal, and the fact that sometimes a stent is indicated based on the size and the density of the stone as determined on the CAT scan.  Additionally, we discussed percutaneous nephrostolithotomy.  Including the mini PERC.  With its attendant risks of anesthesia bleeding infection and the fact that is a invasive procedure with the remote possibility of a nephrectomy.  We also discussed the use of ureteroscopy which is a rigid or flexible instrument placed up into the kidney to break up stones with the laser beam and very likely a postop stent and a high likelihood of additional concomitant procedures.  Following an informed consent, he was brought to the operative suite and underwent induction of general endotracheal anesthetic.  The stone was localized at F2 and a total of 3000 shockwaves was administered without complication.  There was excellent fragmentation  He was awake and alert and returned to recovery room.         Procedure(s):  EXTRACORPOREAL SHOCKWAVE LITHOTRIPSY    Surgeon(s):  Jonathan Saini,  MD    Anesthesia: see anesthesia record    Staff:   Circulator: Carina Prado RN  Scrub Person: Viviana Gutierrez; Kathie Morocho    Estimated Blood Loss: none  Urine Voided: * No values recorded between 11/1/2024  8:44 AM and 11/1/2024  9:22 AM *    Specimens:                None      Drains: None    Findings: Excellent fragmentation    Blood: N/A    Complications: None    Grafts and Implants: None    Jonathan Saini MD     Date: 11/1/2024  Time: 09:22 EDT

## 2024-11-01 NOTE — ANESTHESIA PROCEDURE NOTES
Airway  Urgency: elective    Date/Time: 11/1/2024 8:48 AM  Airway not difficult    General Information and Staff    Patient location during procedure: OR  CRNA/CAA: Hung Hinton CRNA    Indications and Patient Condition    Preoxygenated: yes  MILS not maintained throughout  Mask difficulty assessment: 0 - not attempted    Final Airway Details  Final airway type: supraglottic airway      Successful airway: unique  Size 3     Number of attempts at approach: 1  Assessment: lips, teeth, and gum same as pre-op and atraumatic intubation    Additional Comments  Atraumatic LMA placement, dentition unchanged.

## 2024-11-01 NOTE — ANESTHESIA POSTPROCEDURE EVALUATION
Patient: Liat Mcginnis    Procedure Summary       Date: 11/01/24 Room / Location: [facility] OR 09 /  [facility] OR    Anesthesia Start: 0843 Anesthesia Stop: 0927    Procedure: EXTRACORPOREAL SHOCKWAVE LITHOTRIPSY (Right) Diagnosis:       Renal calculus, right      Right flank pain      Lower abdominal pain      (Renal calculus, right [N20.0])      (Right flank pain [R10.9])      (Lower abdominal pain [R10.30])    Surgeons: Jonathan Saini MD Provider: Ervin Bergman MD    Anesthesia Type: general ASA Status: 3            Anesthesia Type: general    Vitals  Vitals Value Taken Time   /68 11/01/24 0955   Temp 97.4 °F (36.3 °C) 11/01/24 0930   Pulse 67 11/01/24 0955   Resp 19 11/01/24 0955   SpO2 95 % 11/01/24 0955           Post Anesthesia Care and Evaluation    Patient location during evaluation: PHASE II  Patient participation: complete - patient participated  Level of consciousness: awake and alert  Pain score: 0  Pain management: adequate    Airway patency: patent  Anesthetic complications: No anesthetic complications    Cardiovascular status: acceptable  Respiratory status: acceptable  Hydration status: acceptable

## 2024-11-01 NOTE — INTERVAL H&P NOTE
H&P reviewed. The patient was examined and there are no changes to the H&P.         Progress Note( Dr. Bri Pineda)  9/17/2022  Subjective:   Admit Date: 8/28/2022  PCP: Maite Murray MD    Admitted For :Fatigue and possible sepsis from UTI    Consulted For: Control of blood glucose as patient has having hypoglycemic episodes    Interval History: feeling Somewhat  tired  had  no more episode of low blood glucose yesterday  Patient septic with fungemia . in addition to Bbacteremia      Pt had Following Urology procedure done early morng hour spf 9/8/2022  Cystoscopy, left ureteral stent exchange,  suprapubic tube exchange. Patient had RAFFI done and was negative for bacterial endocarditis with a vegetation  Patient was transferred back to medical floor 9/16/2022    Denies any chest pains,   Denies SOB . Denies nausea or vomiting. now eating better  No new bowel or bladder symptoms. Intake/Output Summary (Last 24 hours) at 9/17/2022 1130  Last data filed at 9/17/2022 0935  Gross per 24 hour   Intake 60 ml   Output 1025 ml   Net -965 ml         DATA    CBC:   Recent Labs     09/15/22  0559 09/16/22  0900   WBC 7.6 8.7   HGB 8.8* 9.1*    373      CMP:  Recent Labs     09/15/22  0559 09/16/22  0859    142   K 3.6 3.9    101   CO2 27 29   BUN 33* 29*   CREATININE 2.6* 2.6*   CALCIUM 8.1* 8.5   PROT 5.2* 6.2*   LABALBU 2.6* 3.0*   BILITOT 0.2 0.3   ALKPHOS 96 107   AST 25 27   ALT 9* 11       Lipids: No results found for: CHOL, HDL, TRIG  Glucose:  Recent Labs     09/17/22  0529 09/17/22  0725 09/17/22  1116   POCGLU 151* 144* 231*       [id number]:  Lab Results   Component Value Date/Time    LABA1C 7.8 08/29/2022 01:30 AM     High Sensitivity TSH:   Lab Results   Component Value Date/Time    TSHHS 7.590 04/28/2022 06:59 AM     Free T3: No results found for: FT3  Free T4:  Lab Results   Component Value Date/Time    T4FREE 1.15 03/27/2022 08:22 AM       CT ABDOMEN PELVIS WO CONTRAST Additional Contrast? None   Final Result   1.  Probable hematuria left urinary collecting system (possible active   hemorrhage into the left urinary collecting system) and mild-to-moderate left   hydronephrosis. 2. The double-J stent appears in unremarkable position without left ureter   calculus evident. 3. Trace abdominopelvic ascites is probably reactive. 4.  Vascular calcifications are noted reflecting calcific atherosclerosis. 5. Trace left pleural effusion with left basilar relaxation atelectasis or   infiltrate. 6. Minimal subsegmental atelectasis posterior right lung base. 7. Small hiatal hernia. XR CHEST 1 VIEW   Final Result   1. Right IJ line with the tip overlying the distal SVC/right atrial region. 2. Low lung volumes with bibasilar atelectasis. No significant change. XR CHEST PORTABLE   Final Result   Satisfactory position of the PICC. No acute cardiopulmonary process         VL DUP UPPER EXTREMITY VENOUS RIGHT   Final Result   No evidence of DVT within the right upper extremity. XR CHEST PORTABLE   Final Result   No acute process. XR ABDOMEN (KUB) (SINGLE AP VIEW)   Final Result   Prominent loops of bowel may represent persistent ileus or partial   obstruction. FL LESS THAN 1 HOUR   Final Result      XR CHEST PORTABLE   Final Result   No abnormalities noted. US ABDOMEN LIMITED Specify organ? LIVER, GALLBLADDER   Final Result   Unremarkable right upper quadrant ultrasound. CT ABDOMEN PELVIS WO CONTRAST Additional Contrast? None   Final Result   1. Left ureteral stent in place. Mild bilateral urinary tract dilatation   with new right perinephric and periureteral stranding raises concern for   urinary tract infection. No urinary stones. 2.  Suprapubic catheter in the decompressed urinary bladder. 3.  Ill-defined stranding/fluid throughout the retroperitoneum may relate to   vascular congestion or reactive changes. No intraperitoneal free air or   abscess.          XR CHEST PORTABLE   Final Result   Increased interstitial opacity. While much or all of this may be chronic,   please correlate with any clinical evidence of acute interstitial edema. Scheduled Medicines   Medications:    insulin glargine  10 Units SubCUTAneous Nightly    chlorhexidine gluconate   Topical Daily    mupirocin   Nasal TID    insulin lispro  0-8 Units SubCUTAneous 2 times per day    anidulafungin  100 mg IntraVENous Q24H    insulin lispro  0-8 Units SubCUTAneous TID WC    Cefiderocol Sulfate Tosylate  1,000 mg IntraVENous Q8H    lidocaine PF  5 mL IntraDERmal Once    sodium chloride flush  5-40 mL IntraVENous 2 times per day    meperidine  12.5 mg IntraVENous Once    ferrous sulfate  325 mg Oral BID WC    carvedilol  6.25 mg Oral BID WC    lidocaine  1 patch TransDERmal Daily    amLODIPine  10 mg Oral Daily    aspirin  81 mg Oral Daily    atorvastatin  80 mg Oral Nightly    levothyroxine  75 mcg Oral Daily    melatonin  10 mg Oral Nightly    pantoprazole  40 mg Oral QAM AC    [Held by provider] enoxaparin  40 mg SubCUTAneous QPM      Infusions:    sodium chloride      sodium chloride      sodium chloride 25 mL (09/17/22 0903)    sodium chloride      dextrose      sodium chloride 25 mL (09/06/22 7687)    dextrose           Objective:   Vitals: BP (!) 143/78   Pulse 74   Temp 98.1 °F (36.7 °C) (Oral)   Resp 16   Ht 5' 8\" (1.727 m)   Wt 208 lb 1.6 oz (94.4 kg)   SpO2 98%   BMI 31.64 kg/m²   General appearance: alert and cooperative with exam  Neck: no JVD or bruit  Thyroid : Normal lobes   Lungs: Has Vesicular Breath sounds   Heart:  regular rate and rhythm  Abdomen: soft, non-tender; bowel sounds normal; no masses,  no organomegaly has suprapubic catheter   Musculoskeletal: Normal  Extremities: extremities normal, , no edema  Neurologic:  Awake, alert, oriented to name, place and time. Cranial nerves II-XII are grossly intact. Motor weakness. Sensory neuropathy. ,  and gait is abnormal.  Stable    Assessment:     Patient Active Problem List:     Gait disturbance     Generalized weakness     Diabetes mellitus (HCC)     Osteoarthritis     Anemia of chronic disorder     Infected implanted bladder sphincter cuff     Escherichia coli urinary tract infection     Hypertension     Sepsis ([address]     Acute encephalopathy     Type 2 diabetes mellitus with hypoglycemia without coma (HCC)     UTI (urinary tract infection) due to urinary indwelling catheter (HCC)     Hyperkalemia     Acute kidney failure (HCC)     Leg weakness, bilateral     Type 2 diabetes mellitus with neurological manifestations, uncontrolled ([address]     Complicated UTI (urinary tract infection)     Essential hypertension     Severe muscle deconditioning     Dyslipidemia due to type 2 diabetes mellitus (HCC)     Frequent falls     Chronic kidney disease, stage 3a ([address]     Uncontrolled type 2 diabetes mellitus with peripheral neuropathy (HCC)     Prostate cancer (HCC)     Suprapubic catheter ([address]     Sepsis due to urinary tract infection ([address]     Coagulase negative Staphylococcus bacteremia     Chronic kidney disease, stage IV (severe) (HCC)     Acute metabolic encephalopathy     SVT (supraventricular tachycardia) (HCC)     Metabolic encephalopathy     History of prostate cancer     Cigarette nicotine dependence without complication     Altered mental status     Serratia marcescens infection     Hypoglycemia         Plan:     Reviewed POC blood glucose . Labs and X ray results   Reviewed Current Medicines   On Correction bolus Humalog/ Basal Lantus ( On Hold )Insulin regime /   Monitor Blood glucose frequently   Modified  the dose of Insulin/ other medicines as neede  Management is working to show the patient to skilled nursing unit possibly [facility] home  Will follow     .      Lary Friend MD, MD

## 2024-11-01 NOTE — ANESTHESIA PREPROCEDURE EVALUATION
Anesthesia Evaluation     Patient summary reviewed and Nursing notes reviewed   history of anesthetic complications (spinal headache):   NPO Solid Status: > 8 hours  NPO Liquid Status: > 8 hours           Airway   Mallampati: II  TM distance: >3 FB  Neck ROM: full  Dental - normal exam     Pulmonary - negative pulmonary ROS and normal exam   Cardiovascular   Exercise tolerance: good (4-7 METS)    Rhythm: regular  Rate: normal    (+) hypertension, CAD, dysrhythmias PAC, hyperlipidemia      Neuro/Psych  (+) headaches  GI/Hepatic/Renal/Endo    (+) GERD, renal disease- stones, thyroid problem hypothyroidism    Musculoskeletal     Abdominal     Abdomen: soft.   Substance History - negative use     OB/GYN    (-) history of pregnancy induced hypertension        Other - negative ROS       ROS/Med Hx Other:   Normal sinus rhythm  Normal ECG  No previous ECGs available                     Anesthesia Plan    ASA 3     general     intravenous induction     Anesthetic plan, risks, benefits, and alternatives have been provided, discussed and informed consent has been obtained with: patient.  Pre-procedure education provided  Use of blood products discussed with  Consented to blood products.    Plan discussed with CRNA.    CODE STATUS:

## 2024-11-25 ENCOUNTER — OFFICE VISIT (OUTPATIENT)
Dept: GASTROENTEROLOGY | Facility: CLINIC | Age: 69
End: 2024-11-25
Payer: MEDICARE

## 2024-11-25 VITALS
WEIGHT: 139 LBS | DIASTOLIC BLOOD PRESSURE: 81 MMHG | SYSTOLIC BLOOD PRESSURE: 128 MMHG | BODY MASS INDEX: 27.29 KG/M2 | RESPIRATION RATE: 18 BRPM | OXYGEN SATURATION: 97 % | HEIGHT: 60 IN | HEART RATE: 60 BPM

## 2024-11-25 DIAGNOSIS — K21.00 GASTROESOPHAGEAL REFLUX DISEASE WITH ESOPHAGITIS WITHOUT HEMORRHAGE: Primary | ICD-10-CM

## 2024-11-25 PROCEDURE — 99203 OFFICE O/P NEW LOW 30 MIN: CPT

## 2024-11-25 PROCEDURE — 1159F MED LIST DOCD IN RCRD: CPT

## 2024-11-25 PROCEDURE — 1160F RVW MEDS BY RX/DR IN RCRD: CPT

## 2024-11-25 NOTE — PROGRESS NOTES
Date of Consultation:  11/25/2024  Referring Physician: Barbara León MD    Chief Complaint  Establish Care    Liat Mcginnis is a 68 y.o. female who presents today to [facility] for Establish Care.    HPI:   68-year-old female with PMH of GERD previously followed by Dr. Rosen who wishes to establish care today.  Patient states that in the past her acid reflux has been controlled with famotidine.  Most recent EGD performed on 9/22/2023.  This was notable for 2-3 cm hiatal hernia, erosive ulcerative reflux esophagitis LA grade a in the distal esophagus, otherwise normal EGD.  Pathology negative for intestinal metaplasia or H. pylori.  Patient was placed on famotidine 40 mg once daily.  She reports having a Cologuard approximately 1 year ago that was negative.  Today, patient states that she is not having any problems.  She is not currently on PPI or H2 blocker therapy.  States that she can control her acid reflux with diet and eating slowly.  She reports having acid reflux flares <2-3 times each month.  She takes OTC Prilosec as needed.  She denies unintentional weight loss, nausea, vomiting, abdominal pain, melena, and hematochezia.  No other complaints today.        Previous History:   Past Medical History:   Diagnosis Date    Coronary artery disease     Disease of thyroid gland     Elevated cholesterol     GERD (gastroesophageal reflux disease)     Hypertension     Spinal headache       Past Surgical History:   Procedure Laterality Date    BREAST SURGERY      COLONOSCOPY      ENDOSCOPY      EXTRACORPOREAL SHOCK WAVE LITHOTRIPSY (ESWL) Right 11/1/2024    Procedure: EXTRACORPOREAL SHOCKWAVE LITHOTRIPSY;  Surgeon: Jonathan Saini MD;  Location: [facility];  Service: Urology;  Laterality: Right;    FRACTURE SURGERY      JOINT REPLACEMENT      SKIN BIOPSY        Social History     Socioeconomic History    Marital status:    Tobacco Use     "Smoking status: Never    Smokeless tobacco: Never   Vaping Use    Vaping status: Never Used   Substance and Sexual Activity    Alcohol use: Defer    Drug use: Defer    Sexual activity: Defer        Current Medications:  Current Outpatient Medications   Medication Sig Dispense Refill    hydroCHLOROthiazide 25 MG tablet Take 1 tablet by mouth Daily.      HYDROcodone-acetaminophen (NORCO)  MG per tablet Take 1 tablet by mouth Every 6 (Six) Hours As Needed for Moderate Pain (Pain). 12 tablet 0    ketorolac (TORADOL) 10 MG tablet Take 1 tablet by mouth Every 6 (Six) Hours As Needed for Moderate Pain. 20 tablet 0    levothyroxine (SYNTHROID, LEVOTHROID) 25 MCG tablet Take 1 tablet by mouth Daily.      metoprolol succinate XL (TOPROL-XL) 50 MG 24 hr tablet Take 1 tablet by mouth Daily.      ondansetron (Zofran) 4 MG tablet Take 1 tablet by mouth Every 12 (Twelve) Hours As Needed for Nausea. 20 tablet 0    Repatha solution prefilled syringe injection Inject 1 mL under the skin into the appropriate area as directed Every 14 (Fourteen) Days.      tamsulosin (FLOMAX) 0.4 MG capsule 24 hr capsule Take 1 capsule by mouth Daily. 30 capsule 5     No current facility-administered medications for this visit.       Allergies:   No Known Allergies    Vitals:   /81 (BP Location: Right arm, Patient Position: Sitting, Cuff Size: Adult)   Pulse 60   Resp 18   Ht 152.4 cm (60\")   Wt 63 kg (139 lb)   SpO2 97%   BMI 27.15 kg/m²   Estimated body mass index is 27.15 kg/m² as calculated from the following:    Height as of this encounter: 152.4 cm (60\").    Weight as of this encounter: 63 kg (139 lb).    Liat Vera SHI Mcginnis  reports that she has never smoked. She has never used smokeless tobacco. I have educated her on the risk of diseases from using tobacco products such as cancer, COPD, and heart disease.       ROS:   Review of Systems   Constitutional: Negative.    HENT: Negative.     Cardiovascular: Negative.  "   Gastrointestinal: Negative.    All other systems reviewed and are negative.       Physical Exam:   Physical Exam  Vitals reviewed.   Constitutional:       General: She is not in acute distress.     Appearance: She is well-groomed. She is not toxic-appearing.   HENT:      Head: Normocephalic and atraumatic.      Mouth/Throat:      Mouth: Mucous membranes are moist.   Eyes:      Extraocular Movements: Extraocular movements intact.   Cardiovascular:      Rate and Rhythm: Normal rate and regular rhythm.      Heart sounds: Normal heart sounds. No murmur heard.  Pulmonary:      Effort: Pulmonary effort is normal. No respiratory distress.      Breath sounds: Normal breath sounds. No stridor. No wheezing or rales.   Abdominal:      General: Bowel sounds are normal. There is no distension.      Palpations: Abdomen is soft. There is no mass.      Tenderness: There is no abdominal tenderness. There is no guarding or rebound.      Hernia: No hernia is present.   Skin:     General: Skin is warm.      Coloration: Skin is not jaundiced.      Findings: No rash.   Neurological:      Mental Status: She is alert and oriented to person, place, and time.   Psychiatric:         Mood and Affect: Mood and affect normal.         Speech: Speech normal.         Behavior: Behavior normal. Behavior is cooperative.         Thought Content: Thought content normal.          Lab Results:   Lab Results   Component Value Date    WBC 6.37 10/30/2024    HGB 14.2 10/30/2024    HCT 44.4 10/30/2024    MCV 89.0 10/30/2024    RDW 13.3 10/30/2024     10/30/2024    NEUTRORELPCT 85.0 03/29/2022    LYMPHORELPCT 10.0 03/29/2022    MONORELPCT 4.0 03/29/2022    EOSRELPCT 0.0 03/29/2022    BASORELPCT 0.0 03/29/2022    NEUTROABS 9.93 (H) 03/29/2022    LYMPHSABS 1.12 (L) 03/29/2022       Lab Results   Component Value Date     10/30/2024    K 3.5 10/30/2024    CO2 29.1 (H) 10/30/2024     10/30/2024    BUN 19 10/30/2024    CREATININE 0.73  "10/30/2024    EGFRIFNONA 57 (L) 04/15/2022    EGFRIFAFRI >60 04/15/2022    GLUCOSE 78 10/30/2024    CALCIUM 9.6 10/30/2024    ALKPHOS 170 (H) 04/15/2022    AST 32 04/15/2022    ALT 29 04/15/2022    BILITOT 0.4 04/15/2022    ALBUMIN 5.1 04/15/2022    PROTEINTOT 7.6 04/15/2022    PROTEINTOT 7.9 04/15/2022    MG 2.3 04/15/2022       Pathology:        Endoscopy:        Imaging:   CT Abdomen With & Without Contrast    Result Date: 2024  1. Nonobstructing 7 mm right renal pelvis stone. 2. There is no intrarenal stone or hydronephrosis. 3. There are parapelvic cysts involving the left kidney. 4. There is no solid renal mass. 5. Small hiatal hernia. Images reviewed, interpreted, dictated and electronically signed by Devon Singh MD Voice transcription technology ([employer]) is used for the dictation of this note and \"sound-alike\" words might be erroneously placed despite reviewing this note for accuracy. Errors in dictation may reflect use of voice recognition software and not all errors in transcription may have been detected prior to signing.    Mammo Screening Digital Tomosynthesis Bilateral With CAD    Result Date: 8/3/2024  No mammographic evidence of malignancy. BI-RADS CATEGORY: 2 , BENIGN FINDING(S).     RECOMMENDED FOLLOW-UP:  Routine annual screening mammography. A letter has been sent to the patient giving results in layperson's terminology. NOTES: Mammography does not detect approximately 10-15% of breast cancers. Physical examination of the breasts by a physician and regular monthly breast self examinations are integral parts of breast cancer screening.   : 1955 Images reviewed, interpreted, and dictated by Alma Rosa Neal MD    US abdomen complete    Result Date: 2024  Fatty infiltration of the liver. Lobulated left kidney, recommend renal protocol CT to evaluate for underlying lesion. Images reviewed, interpreted, and dictated by Dr. MAI Baires. Transcribed by Yessy Craig " BARB Viramontes.       Results review: During today's encounter, all relevant clinical data has been reviewed.      Assessment and Plan  1. Gastroesophageal reflux disease with esophagitis without hemorrhage (Primary)  Discussed management for GERD: encouraged weight loss, HOB elevation at night, avoidance of meals 2-3 hours before bedtime, avoid trigger foods (caffeine, alcohol, chocolate, acidic/spicy foods e.g oranges/tomatoes), and encouraged smoking cessation  Patient reports acid reflux controlled currently with diet.  She denies dysphagia or other concerning symptoms.      New Medications:   No orders of the defined types were placed in this encounter.      Discontinued Medications:   There are no discontinued medications.     Visit Diagnoses:    ICD-10-CM ICD-9-CM   1. Gastroesophageal reflux disease with esophagitis without hemorrhage  K21.00 530.81     530.10            Follow Up:   Return if symptoms worsen or fail to improve.    The patient was in agreement with the plan and all questions were answered to patient's satisfaction.        This document has been electronically signed by Gaby Birmingham PA-C   November 25, 2024 16:12 EST    Dictated Utilizing Dragon Dictation: Part of this note may be an electronic transcription/translation of spoken language to printed text using the Dragon Dictation System.    CC:  MD Mau Delarosa Gina Leanne, MD

## 2024-11-26 ENCOUNTER — HOSPITAL ENCOUNTER (OUTPATIENT)
Dept: GENERAL RADIOLOGY | Facility: HOSPITAL | Age: 69
Discharge: HOME | End: 2024-11-26
Admitting: UROLOGY
Payer: COMMERCIAL

## 2024-11-26 ENCOUNTER — OFFICE VISIT (OUTPATIENT)
Dept: UROLOGY | Facility: CLINIC | Age: 69
End: 2024-11-26
Payer: COMMERCIAL

## 2024-11-26 VITALS
WEIGHT: 139 LBS | BODY MASS INDEX: 26.24 KG/M2 | SYSTOLIC BLOOD PRESSURE: 134 MMHG | HEIGHT: 61 IN | HEART RATE: 65 BPM | DIASTOLIC BLOOD PRESSURE: 81 MMHG

## 2024-11-26 DIAGNOSIS — N20.0 RENAL CALCULUS, RIGHT: Primary | ICD-10-CM

## 2024-11-26 DIAGNOSIS — R10.9 RIGHT FLANK PAIN: ICD-10-CM

## 2024-11-26 DIAGNOSIS — N28.1 ACQUIRED RENAL CYST OF LEFT KIDNEY: ICD-10-CM

## 2024-11-26 DIAGNOSIS — R10.30 LOWER ABDOMINAL PAIN: ICD-10-CM

## 2024-11-26 DIAGNOSIS — K59.04 CHRONIC IDIOPATHIC CONSTIPATION: ICD-10-CM

## 2024-11-26 PROCEDURE — 74018 RADEX ABDOMEN 1 VIEW: CPT

## 2024-11-26 NOTE — PROGRESS NOTES
"Chief Complaint  RIGHT RENAL STONE/FLANK/ABDOMINAL/BACK PAIN (3 WEEKS FOLLOW UP POST RIGHT ESWL)    Krishna Murphy presents to [facility] for   History of Present Illness   History of Present Illness  MS COLTEN MURPHY IS A 68 YEAR OLD VERY PLEASANT Patient  seen for follow up today. She had Extracorporal Shockwave Lithotripsy  (ESWL) for RIGHT 7MM painful renal stone By Dr. Saini 11/01/2024. She reports doing relatively well post procedure and is in no apparent distress upon exam today.    She has not had any post opt complications associated with the procedure  such as  Fevers, N/V, severe  pain in the flank, hematoma, spontaneous renal hemorrhage, or the risk of distal fragments. She has passed adequate fragmentation of stones and \"Feels Great\" she sates.    Her repeat kub today is negative for any renal or ureteral stones-resolved    FINDINGS:XR Abdomen KUB 11/26/24    Gastrointestinal tract:  Unremarkable.  No dilation.    Organs:  Previously described stone in the right proximal ureter orkidney region is not evident on current exam.  Calcifications in the Right lower pelvis likely correspond to a degenerated fibroid and are stable. Bones/joints:  Unremarkable.  No acute fracture.     IMPRESSION:  1.  Previously described stone in the right proximal ureter or kidney region is not evident on current exam.  2.  Calcifications in the right lower pelvis likely correspond to a  degenerated fibroid and are stable    FINDINGS:XR Abdomen KUB 10/10/24  1. View of the abdomen Moderate to large stool burden .Large calcification projecting over the right kidney. Calcification in the pelvis, likely uterine calcification.    CT Abdomen With & Without Contrast 09/11/24  1. Nonobstructing 7 mm right renal pelvis stone.  2. There is no intrarenal stone or hydronephrosis.  3. There are parapelvic cysts involving the left kidney.  4. There is no solid " "renal mass.  5. Small hiatal herniA     Active Ambulatory Problems     Diagnosis Date Noted    Renal calculus, right 10/10/2024    Right flank pain 10/10/2024    Frequency of urination 10/10/2024    Lower abdominal pain 10/10/2024    Chronic idiopathic constipation 10/10/2024    Nausea without vomiting 10/10/2024    Acquired renal cyst of left kidney 11/30/2024     Resolved Ambulatory Problems     Diagnosis Date Noted    No Resolved Ambulatory Problems     Past Medical History:   Diagnosis Date    Coronary artery disease     Disease of thyroid gland     Elevated cholesterol     GERD (gastroesophageal reflux disease)     Hypertension     Spinal headache       Objective   Vital Signs:   /81 (BP Location: Right arm, Patient Position: Sitting)   Pulse 65   Ht 154.2 cm (60.71\")   Wt 63 kg (139 lb)   BMI 26.52 kg/m²       ROS:   Review of Systems   Constitutional:  Positive for activity change, appetite change, fatigue and unexpected weight gain. Negative for chills, diaphoresis, fever and unexpected weight loss.   HENT: Negative.  Negative for congestion, ear discharge, ear pain, nosebleeds, rhinorrhea, sinus pressure and sore throat.    Eyes: Negative.  Negative for blurred vision, double vision, photophobia, pain, redness and visual disturbance.   Respiratory:  Negative for apnea, cough, chest tightness, shortness of breath, wheezing and stridor.    Cardiovascular:  Negative for chest pain, palpitations and leg swelling.   Gastrointestinal:  Positive for abdominal distention, abdominal pain and constipation. Negative for diarrhea, nausea and vomiting.   Endocrine: Negative.  Negative for polydipsia, polyphagia and polyuria.   Genitourinary:  Positive for flank pain, pelvic pain and pelvic pressure. Negative for decreased urine volume, difficulty urinating, dyspareunia, dysuria, frequency, genital sores, hematuria, urgency, urinary incontinence and vaginal discharge.   Musculoskeletal:  Positive for back pain. " Negative for arthralgias and joint swelling.   Skin:  Positive for color change and dry skin. Negative for pallor, rash and wound.   Allergic/Immunologic: Negative.    Neurological:  Negative for dizziness, tremors, syncope, weakness, light-headedness, headache, memory problem and confusion.   Hematological: Negative.    Psychiatric/Behavioral:  Positive for sleep disturbance and stress. Negative for behavioral problems and decreased concentration.         Physical Exam  Constitutional:       General: She is in acute distress.      Appearance: She is well-developed.   HENT:      Head: Normocephalic and atraumatic.   Eyes:      Pupils: Pupils are equal, round, and reactive to light.   Neck:      Thyroid: No thyromegaly.      Trachea: No tracheal deviation.   Cardiovascular:      Rate and Rhythm: Normal rate and regular rhythm.      Heart sounds: No murmur heard.  Pulmonary:      Effort: Pulmonary effort is normal. No respiratory distress.      Breath sounds: Normal breath sounds. No stridor. No wheezing.   Abdominal:      General: Bowel sounds are normal. There is distension.      Palpations: Abdomen is soft.      Tenderness: There is abdominal tenderness.   Genitourinary:     Labia:         Right: No tenderness.         Left: No tenderness.       Vagina: Normal. No vaginal discharge.      Comments: Reports pelvic pressure and suprapubic discomfort  Musculoskeletal:         General: No deformity. Normal range of motion.      Cervical back: Normal range of motion.   Skin:     General: Skin is warm and dry.      Capillary Refill: Capillary refill takes less than 2 seconds.      Coloration: Skin is not pale.      Findings: No erythema or rash.   Neurological:      Mental Status: She is alert and oriented to person, place, and time.      Cranial Nerves: No cranial nerve deficit.      Sensory: No sensory deficit.      Motor: Weakness present.      Coordination: Coordination normal.   Psychiatric:         Behavior:  "Behavior normal.         Thought Content: Thought content normal.         Judgment: Judgment normal.        Physical Exam    Result Review :     UA          10/10/2024    11:24   Urinalysis   Ketones, UA Negative    Leukocytes, UA Negative               Assessment and Plan    Problem List Items Addressed This Visit          Gastrointestinal Abdominal     Right flank pain    Lower abdominal pain    Chronic idiopathic constipation       Genitourinary and Reproductive     Renal calculus, right - Primary    Acquired renal cyst of left kidney     Assessment & Plan     ASSESSMENT  RIGHT 7MM RENAL STONE -RESOLVED/FLANK/ABD PAIN/IBS-C    MS COLTEN MURPHY IS A 68 YEAR OLD VERY PLEASANT Patient  seen for follow up today. She had Extracorporal Shockwave Lithotripsy  (ESWL) for RIGHT 7MM painful renal stone By Dr. Saini 11/01/2024. She reports doing relatively well post procedure and is in no apparent distress upon exam today.    She has not had any post opt complications associated with the procedure  such as  Fevers, N/V, severe  pain in the flank, hematoma, spontaneous renal hemorrhage, or the risk of distal fragments. She has passed adequate fragmentation of stones and \"Feels Great\" she sates.    We both reviewed HER  repeat KUB today which shows that the 7MM RIGHT RENAL stone seen on the previous KUB/CT STONE is not demonstratable on today's KUB. More so, No other stones are noted along the course of either ureter. SHE however has pelvic calcifications consistent with uterine fibroid.  Has been recommended to follow-up with OB/GYN.    LEFT Renal cyst-I discussed the Bosniak classification of renal cysts.  I discussed the strict criteria involved with making a decision regarding intervention.  We discussed the fact that this is likely a Bosniak type I cyst which has sharp distinct borders and a thin wall and no internal echoes versus a Bosniak 2 with a thicker wall and faint striations. We discussed the risks of " malignancy in these situations is essentially 0 and requires no further intervention however we discussed the fact that a Bosniak 3 has about a 28% chance of malignancy and finally a Bosniak for probably is representative of a renal cell carcinoma variant. ALSO discussed the fact that these are generally asymptomatic and do not require intervention and that the appropriate surgical management is a radiographic cyst puncture when causing pain or problems particularly on the left with an early satiety.THE PATIENT IS ASYMPTOMATIC, WILL MONITOR       PLAN  Patient encouraged to follow-up with OB/GYN for evaluation-uterine fibroid     Also continue bowel regimen with daily MiraLAX, due to IBS/chronic idiopathic constipation     Continue as needed medications as required for flank pain    We discussed  OTHER treatment options for HER BACK/FLANK pain with patient encouraged to continue conservative therapy alternating NSAID/Tylenol as tolerated, Also including hot baths, showers, warm compresses to lower back as tolerated. Also encouraged walking, physical therapy, light exercises as tolerated    Rest is the most important treatment in the case of BACK/FLANK pain. Rest and physical therapy are enough to improve minor pain. Discussed to monitor his daily routine with prevention of flank pain by: At least Drinking 8 glasses of water per day, Limiting your alcohol consumption.  Having a healthy diet containing fruits, vegetables, and a lot of fluids, Practicing safe sex.  Also maintaining proper hygiene of your body as well as the environment.    Discussed a kidney stone prevention diet to include increasing p.o. fluid intake, to at least 1 to 2 L of water daily.  She is to avoid caffeine products such as cola, coffee, and to avoid soft or soda drinks.  She is to decrease her sodium consumption as in  Fast foods, curtis, salted nuts, canned foods, and smoked/cured foods. She is also to decrease her oxalate consumption, as in  spinach, Jane greens, and Rhubarb.  Also important is to decrease protein intake, as in red meats, peanut butter, and also avoid nuts.    FOLLOW UP WITH GI-ABDOMINAL PAIN, IBS-C    Return to clinic sooner if need be     Patient is agreeable  WITH plan of care.     Patient reports that she is not currently experiencing any symptoms of urinary incontinence.    RADIOLOGY (CT AND/OR KUB):    CT Abdomen and Pelvis: No results found for this or any previous visit.     CT Stone Protocol: No results found for this or any previous visit.     KUB: Results for orders placed during the hospital encounter of 10/10/24    XR Abdomen KUB    Narrative  EXAMINATION: XR ABDOMEN KUB-    CLINICAL INDICATION: flank pain; R10.9-Unspecified abdominal pain      COMPARISON: None available    FINDINGS:  1. View of the abdomen    Moderate to large stool burden    Large calcification projecting over the right kidney.    Calcification in the pelvis, likely uterine calcification.      This report was finalized on 10/10/2024 10:28 AM by Dr. Micheal Deleon MD.       [unfilled]  LABS (3 MONTHS):    Admission on 11/01/2024, Discharged on 11/01/2024   Component Date Value Ref Range Status    QT Interval 11/01/2024 456  ms Final    QTC Interval 11/01/2024 459  ms Final   Pre-Admission Testing on 10/30/2024   Component Date Value Ref Range Status    Glucose 10/30/2024 78  65 - 99 mg/dL Final    BUN 10/30/2024 19  8 - 23 mg/dL Final    Creatinine 10/30/2024 0.73  0.57 - 1.00 mg/dL Final    Sodium 10/30/2024 141  136 - 145 mmol/L Final    Potassium 10/30/2024 3.5  3.5 - 5.2 mmol/L Final    Chloride 10/30/2024 101  98 - 107 mmol/L Final    CO2 10/30/2024 29.1 (H)  22.0 - 29.0 mmol/L Final    Calcium 10/30/2024 9.6  8.6 - 10.5 mg/dL Final    BUN/Creatinine Ratio 10/30/2024 26.0 (H)  7.0 - 25.0 Final    Anion Gap 10/30/2024 10.9  5.0 - 15.0 mmol/L Final    eGFR 10/30/2024 89.2  >60.0 mL/min/1.73 Final    WBC 10/30/2024 6.37  3.40 - 10.80 10*3/mm3 Final     RBC 10/30/2024 4.99  3.77 - 5.28 10*6/mm3 Final    Hemoglobin 10/30/2024 14.2  12.0 - 15.9 g/dL Final    Hematocrit 10/30/2024 44.4  34.0 - 46.6 % Final    MCV 10/30/2024 89.0  79.0 - 97.0 fL Final    MCH 10/30/2024 28.5  26.6 - 33.0 pg Final    MCHC 10/30/2024 32.0  31.5 - 35.7 g/dL Final    RDW 10/30/2024 13.3  12.3 - 15.4 % Final    RDW-SD 10/30/2024 43.5  37.0 - 54.0 fl Final    MPV 10/30/2024 10.4  6.0 - 12.0 fL Final    Platelets 10/30/2024 293  140 - 450 10*3/mm3 Final   Office Visit on 10/10/2024   Component Date Value Ref Range Status    Color 10/10/2024 Yellow  Yellow, Straw, Dark Yellow, Cynthia Final    Clarity, UA 10/10/2024 Clear  Clear Final    Specific Gravity  10/10/2024 1.010  1.005 - 1.030 Final    pH, Urine 10/10/2024 6.0  5.0 - 8.0 Final    Leukocytes 10/10/2024 Negative  Negative Final    Nitrite, UA 10/10/2024 Negative  Negative Final    Protein, POC 10/10/2024 Negative  Negative mg/dL Final    Glucose, UA 10/10/2024 Negative  Negative mg/dL Final    Ketones, UA 10/10/2024 Negative  Negative Final    Urobilinogen, UA 10/10/2024 Normal  Normal, 0.2 E.U./dL Final    Bilirubin 10/10/2024 Negative  Negative Final    Blood, UA 10/10/2024 Negative  Negative Final    Lot Number 10/10/2024 [id number]   Final    Expiration Date 10/10/2024 10/25/2024   Final        Follow Up   Return if symptoms worsen or fail to improve, for Next scheduled follow up FOR KIDNEY STONES/RESOLVED/LEFT RENAL CYSTS/IBS-C.    Patient was given instructions and counseling regarding her condition or for health maintenance advice. Please see specific information pulled into the AVS if appropriate.          This document has been electronically signed by Griselda Cheng-Akwa, APRN   November 30, 2024 10:27 EST      Dictated Utilizing Dragon Dictation: Part of this note may be an electronic transcription/translation of spoken language to printed text using the Dragon Dictation System.      Patient or patient representative  verbalized consent for the use of Ambient Listening during the visit with  Griselda Cheng-Akwa, APRN for chart documentation. 11/30/2024  10:27 EST

## 2024-11-27 ENCOUNTER — OFFICE VISIT (OUTPATIENT)
Dept: SURGERY | Facility: CLINIC | Age: 69
End: 2024-11-27
Payer: MEDICARE

## 2024-11-27 VITALS
HEIGHT: 61 IN | WEIGHT: 137 LBS | DIASTOLIC BLOOD PRESSURE: 79 MMHG | BODY MASS INDEX: 25.86 KG/M2 | SYSTOLIC BLOOD PRESSURE: 121 MMHG

## 2024-11-27 DIAGNOSIS — K44.9 HIATAL HERNIA: Primary | ICD-10-CM

## 2024-11-27 NOTE — PROGRESS NOTES
"Date of Service: 11/27/2024    Subjective   Liat Mcginnis is a 68 y.o. female is being seen for consultation for hiatal hernia today at the request of Barbara León MD    Chief complaint: none    Liat Mcginnis is a 68 y.o.  female who presents to my office with hiatal hernia.  This was found on endoscopy in the past.  She reports only mild GERD symptoms its only controlled with lifestyle changes.  She only has symptoms when she eats too much or certain foods.  She reports not taking any medication for this but Pepcid is noted on her med rec.  Denies dysphagia or regurgitation  Past Medical History:   Diagnosis Date    Coronary artery disease     Disease of thyroid gland     Elevated cholesterol     GERD (gastroesophageal reflux disease)     Hypertension     Spinal headache        Past Surgical History:   Procedure Laterality Date    BREAST SURGERY      COLONOSCOPY      ENDOSCOPY      EXTRACORPOREAL SHOCK WAVE LITHOTRIPSY (ESWL) Right 11/1/2024    Procedure: EXTRACORPOREAL SHOCKWAVE LITHOTRIPSY;  Surgeon: Jonathan Saini MD;  Location: [facility];  Service: Urology;  Laterality: Right;    FRACTURE SURGERY      JOINT REPLACEMENT      SKIN BIOPSY           Family History   Problem Relation Age of Onset    Cancer Mother     Cancer Father          Social History     Socioeconomic History    Marital status:    Tobacco Use    Smoking status: Never     Passive exposure: Never    Smokeless tobacco: Never   Vaping Use    Vaping status: Never Used   Substance and Sexual Activity    Alcohol use: Defer    Drug use: Defer    Sexual activity: Defer                Review of Systems     14 pt ROS negative except for what is noted in HPI        Objective     Physical Exam:      11/27/24  1145   Weight: 62.1 kg (137 lb)    Body mass index is 26.13 kg/m².  Constitution: /79   Ht 154.2 cm (60.71\")   Wt 62.1 kg (137 lb)   BMI 26.13 kg/m²  . No acute distress   Head: Normocephalic, atraumatic. "   Eyes: Aligned without strabismus. Conjunctivae noninjected   Ears, Nose, Mouth:, no lesions appreciated   Respiratory: Symmetric chest expansion. No respiratory distress.   Gastrointestinal:  Soft, nondistended, nontender  Skin:  No cyanosis, clubbing or edema bilaterally    Lymphatics: No abnormal cervical or supraclavicular adenopathy  Neurologic: No gross deficits.  Alert and oriented x3  Psychiatric: Normal mood              Liat Mcginnis is a 68 y.o.  female with hiatal hernia and medically controlled GERD    We discussed that operative intervention is required only for those with medically refractory GERD/dysphagia related to hiatal hernia.  Unless the patient develops worsening symptoms, I would not recommend further evaluation.                     This document has been electronically signed by Jose R Recinos MD   November 27, 2024 12:41 [facility]

## 2024-11-30 PROBLEM — N28.1 ACQUIRED RENAL CYST OF LEFT KIDNEY: Status: ACTIVE | Noted: 2024-11-30

## 2025-04-28 RX ORDER — MELOXICAM 15 MG/1
15 TABLET ORAL DAILY
COMMUNITY

## 2025-04-30 ENCOUNTER — OFFICE VISIT (OUTPATIENT)
Dept: SURGERY | Facility: CLINIC | Age: 70
End: 2025-04-30
Payer: COMMERCIAL

## 2025-04-30 VITALS
HEIGHT: 61 IN | BODY MASS INDEX: 26.62 KG/M2 | SYSTOLIC BLOOD PRESSURE: 153 MMHG | WEIGHT: 141 LBS | DIASTOLIC BLOOD PRESSURE: 84 MMHG

## 2025-04-30 DIAGNOSIS — K44.9 HIATAL HERNIA: ICD-10-CM

## 2025-04-30 DIAGNOSIS — K59.04 CHRONIC IDIOPATHIC CONSTIPATION: Primary | ICD-10-CM

## 2025-04-30 PROCEDURE — 99213 OFFICE O/P EST LOW 20 MIN: CPT | Performed by: SURGERY

## 2025-04-30 NOTE — PROGRESS NOTES
Patient Name:  Liat Mcginnis  YOB: 1955  [id number]           General Surgery Office Follow Up    Date of Service: 04/30/25    Chief Complaint-constipation    Subjective      Liat Mcginnis is a 69 y.o.  female who I evaluated in November 2024for hiatal hernia but minimally symptomatic GERD.  No further workup was performed at that time.  She does not currently take any medication for it  The patient also reports longstanding constipation having bowel movements every few days.  Constipation was also noted when she had an ESWL for nephrolithiasis.  She then began taking MiraLAX every other day, alternating with fiber Gummies.  She now has a bowel movement daily.  Denies melena or hematochezia.  No family history of colon cancer  Last colonoscopy was 6 to 7 years ago and a polyp was noted  Allergy: No Known Allergies        PMHx:   Past Medical History:   Diagnosis Date    Coronary artery disease     Disease of thyroid gland     Elevated cholesterol     GERD (gastroesophageal reflux disease)     Hypertension     Spinal headache          Past Surgical History:  Past Surgical History:   Procedure Laterality Date    BREAST SURGERY      COLONOSCOPY      ENDOSCOPY      EXTRACORPOREAL SHOCK WAVE LITHOTRIPSY (ESWL) Right 11/1/2024    Procedure: EXTRACORPOREAL SHOCKWAVE LITHOTRIPSY;  Surgeon: Jonathan Saini MD;  Location: [facility];  Service: Urology;  Laterality: Right;    FRACTURE SURGERY      JOINT REPLACEMENT      SKIN BIOPSY           Family History:   Family History   Problem Relation Age of Onset    Cancer Mother     Cancer Father          Social History:  Social History     Socioeconomic History    Marital status:    Tobacco Use    Smoking status: Never     Passive exposure: Never    Smokeless tobacco: Never   Vaping Use    Vaping status: Never Used   Substance and Sexual Activity    Alcohol use: Defer    Drug use: Defer    Sexual activity: Defer            Review of  "Systems     14 pt ROS negative except for what is noted in HPI       Objective     Physical Exam:      Vital Signs  /84   Ht 154.2 cm (60.71\")   Wt 64 kg (141 lb)   BMI 26.90 kg/m²     Body mass index is 26.9 kg/m².    Physical Exam:      Constitution: /84   Ht 154.2 cm (60.71\")   Wt 64 kg (141 lb)   BMI 26.90 kg/m²  . No acute distress.   Head: Normocephalic, atraumatic.   Eyes: Aligned without strabismus. Conjunctivae noninjected   Ears, Nose, Mouth: . No lesions appreciated  Respiratory: Symmetric chest expansion. No respiratory distress.   Gastrointestinal:  soft, nondistended, nontender  Skin:  No cyanosis, clubbing or edema bilaterally   Lymphatics: No abnormal cervical or supraclavicular lymphadenopathy appreciated   Neurologic: No gross deficits. Alert and oriented x3   Psychiatric: normal mood       Results Review: I have personally reviewed all of the recent lab and imaging results available at this time.            Assessment & Plan     Assessment and Plan:  69 y.o.  female with chronic constipation, history of hiatal hernia and minimally symptomatic GERD    Risks and benefits of EGD and colonoscopy discussed with the patient and she elects proceed  I agree with MiraLAX bowel regimen                      This document has been electronically signed by Jose R Recinos MD   April 30, 2025 11:42 EDT    [facility]        "

## 2025-05-27 DIAGNOSIS — K59.04 CHRONIC IDIOPATHIC CONSTIPATION: Primary | ICD-10-CM

## 2025-05-27 DIAGNOSIS — K44.9 HIATAL HERNIA: ICD-10-CM

## 2025-05-27 DIAGNOSIS — R10.30 LOWER ABDOMINAL PAIN: ICD-10-CM

## 2025-05-27 RX ORDER — SODIUM CHLORIDE 0.9 % (FLUSH) 0.9 %
10 SYRINGE (ML) INJECTION EVERY 12 HOURS SCHEDULED
OUTPATIENT
Start: 2025-05-27

## 2025-05-27 RX ORDER — SODIUM CHLORIDE 9 MG/ML
40 INJECTION, SOLUTION INTRAVENOUS AS NEEDED
OUTPATIENT
Start: 2025-05-27

## 2025-05-27 RX ORDER — SODIUM CHLORIDE 0.9 % (FLUSH) 0.9 %
10 SYRINGE (ML) INJECTION AS NEEDED
OUTPATIENT
Start: 2025-05-27

## 2025-07-11 ENCOUNTER — HOSPITAL ENCOUNTER (OUTPATIENT)
Facility: HOSPITAL | Age: 70
Setting detail: HOSPITAL OUTPATIENT SURGERY
Discharge: HOME | End: 2025-07-11
Attending: SURGERY | Admitting: SURGERY
Payer: COMMERCIAL

## 2025-07-11 ENCOUNTER — ANESTHESIA (OUTPATIENT)
Dept: PERIOP | Facility: HOSPITAL | Age: 70
End: 2025-07-11
Payer: COMMERCIAL

## 2025-07-11 ENCOUNTER — ANESTHESIA EVENT (OUTPATIENT)
Dept: PERIOP | Facility: HOSPITAL | Age: 70
End: 2025-07-11
Payer: COMMERCIAL

## 2025-07-11 VITALS
WEIGHT: 135 LBS | OXYGEN SATURATION: 94 % | SYSTOLIC BLOOD PRESSURE: 121 MMHG | HEIGHT: 60 IN | BODY MASS INDEX: 26.5 KG/M2 | DIASTOLIC BLOOD PRESSURE: 78 MMHG | TEMPERATURE: 97 F | HEART RATE: 72 BPM | RESPIRATION RATE: 16 BRPM

## 2025-07-11 DIAGNOSIS — K44.9 HIATAL HERNIA: ICD-10-CM

## 2025-07-11 DIAGNOSIS — R10.30 LOWER ABDOMINAL PAIN: ICD-10-CM

## 2025-07-11 DIAGNOSIS — K59.04 CHRONIC IDIOPATHIC CONSTIPATION: ICD-10-CM

## 2025-07-11 PROCEDURE — 25010000002 PROPOFOL 200 MG/20ML EMULSION: Performed by: NURSE ANESTHETIST, CERTIFIED REGISTERED

## 2025-07-11 PROCEDURE — S0260 H&P FOR SURGERY: HCPCS | Performed by: SURGERY

## 2025-07-11 PROCEDURE — 25810000003 LACTATED RINGERS PER 1000 ML: Performed by: ANESTHESIOLOGY

## 2025-07-11 RX ORDER — PROPOFOL 10 MG/ML
INJECTION, EMULSION INTRAVENOUS AS NEEDED
Status: DISCONTINUED | OUTPATIENT
Start: 2025-07-11 | End: 2025-07-11 | Stop reason: SURG

## 2025-07-11 RX ORDER — SODIUM CHLORIDE 9 MG/ML
40 INJECTION, SOLUTION INTRAVENOUS AS NEEDED
Status: DISCONTINUED | OUTPATIENT
Start: 2025-07-11 | End: 2025-07-11 | Stop reason: HOSPADM

## 2025-07-11 RX ORDER — SODIUM CHLORIDE 0.9 % (FLUSH) 0.9 %
10 SYRINGE (ML) INJECTION EVERY 12 HOURS SCHEDULED
Status: DISCONTINUED | OUTPATIENT
Start: 2025-07-11 | End: 2025-07-11 | Stop reason: HOSPADM

## 2025-07-11 RX ORDER — IPRATROPIUM BROMIDE AND ALBUTEROL SULFATE 2.5; .5 MG/3ML; MG/3ML
3 SOLUTION RESPIRATORY (INHALATION) ONCE AS NEEDED
Status: DISCONTINUED | OUTPATIENT
Start: 2025-07-11 | End: 2025-07-11 | Stop reason: HOSPADM

## 2025-07-11 RX ORDER — SODIUM CHLORIDE 0.9 % (FLUSH) 0.9 %
10 SYRINGE (ML) INJECTION AS NEEDED
Status: DISCONTINUED | OUTPATIENT
Start: 2025-07-11 | End: 2025-07-11 | Stop reason: HOSPADM

## 2025-07-11 RX ORDER — ONDANSETRON 2 MG/ML
4 INJECTION INTRAMUSCULAR; INTRAVENOUS AS NEEDED
Status: DISCONTINUED | OUTPATIENT
Start: 2025-07-11 | End: 2025-07-11 | Stop reason: HOSPADM

## 2025-07-11 RX ORDER — MIDAZOLAM HYDROCHLORIDE 1 MG/ML
0.5 INJECTION, SOLUTION INTRAMUSCULAR; INTRAVENOUS
Status: DISCONTINUED | OUTPATIENT
Start: 2025-07-11 | End: 2025-07-11 | Stop reason: HOSPADM

## 2025-07-11 RX ORDER — POLYETHYLENE GLYCOL 3350 17 G/17G
17 POWDER, FOR SOLUTION ORAL DAILY
Qty: 30 EACH | Refills: 0 | Status: SHIPPED | OUTPATIENT
Start: 2025-07-11

## 2025-07-11 RX ORDER — SODIUM CHLORIDE, SODIUM LACTATE, POTASSIUM CHLORIDE, CALCIUM CHLORIDE 600; 310; 30; 20 MG/100ML; MG/100ML; MG/100ML; MG/100ML
125 INJECTION, SOLUTION INTRAVENOUS ONCE
Status: COMPLETED | OUTPATIENT
Start: 2025-07-11 | End: 2025-07-11

## 2025-07-11 RX ADMIN — PROPOFOL 50 MG: 10 INJECTION, EMULSION INTRAVENOUS at 08:58

## 2025-07-11 RX ADMIN — PROPOFOL 50 MG: 10 INJECTION, EMULSION INTRAVENOUS at 08:51

## 2025-07-11 RX ADMIN — PROPOFOL 50 MG: 10 INJECTION, EMULSION INTRAVENOUS at 09:01

## 2025-07-11 RX ADMIN — PROPOFOL 100 MG: 10 INJECTION, EMULSION INTRAVENOUS at 08:47

## 2025-07-11 RX ADMIN — PROPOFOL 50 MG: 10 INJECTION, EMULSION INTRAVENOUS at 08:49

## 2025-07-11 RX ADMIN — PROPOFOL 50 MG: 10 INJECTION, EMULSION INTRAVENOUS at 08:53

## 2025-07-11 RX ADMIN — SODIUM CHLORIDE, POTASSIUM CHLORIDE, SODIUM LACTATE AND CALCIUM CHLORIDE: 600; 310; 30; 20 INJECTION, SOLUTION INTRAVENOUS at 08:43

## 2025-07-11 RX ADMIN — PROPOFOL 50 MG: 10 INJECTION, EMULSION INTRAVENOUS at 08:55

## 2025-07-11 NOTE — ANESTHESIA POSTPROCEDURE EVALUATION
Patient: Liat Mcginnis    Procedure Summary       Date: 07/11/25 Room / Location:  [facility]  /  [facility]    Anesthesia Start: 0843 Anesthesia Stop: 0905    Procedures:       COLONOSCOPY with possible biopsy      ESOPHAGOGASTRODUODENOSCOPY, possible biopsy or dilation (Esophagus) Diagnosis:       Chronic idiopathic constipation      Lower abdominal pain      Hiatal hernia      (Chronic idiopathic constipation [K59.04])      (Lower abdominal pain [R10.30])      (Hiatal hernia [K44.9])    Surgeons: Jose R Recinos MD Provider: Elan Soetlo MD    Anesthesia Type: general ASA Status: 2            Anesthesia Type: general    Vitals  Vitals Value Taken Time   /78 07/11/25 09:28   Temp 97 °F (36.1 °C) 07/11/25 09:06   Pulse 72 07/11/25 09:28   Resp 16 07/11/25 09:28   SpO2 94 % 07/11/25 09:28           Post Anesthesia Care and Evaluation    Patient location during evaluation: PHASE II  Patient participation: complete - patient participated  Level of consciousness: awake and alert  Pain score: 1  Pain management: adequate    Airway patency: patent  Anesthetic complications: No anesthetic complications  PONV Status: controlled  Cardiovascular status: acceptable  Respiratory status: acceptable  Hydration status: acceptable

## 2025-07-11 NOTE — H&P
Chief Complaint-constipation     Subjective  Liat Jody Mcginnis is a 69 y.o.  female who I evaluated in November 2024for hiatal hernia but minimally symptomatic GERD.  No further workup was performed at that time.  She does not currently take any medication for it  The patient also reports longstanding constipation having bowel movements every few days.  Constipation was also noted when she had an ESWL for nephrolithiasis.  She then began taking MiraLAX every other day, alternating with fiber Gummies.  She now has a bowel movement daily.  Denies melena or hematochezia.  No family history of colon cancer  Last colonoscopy was 6 to 7 years ago and a polyp was noted    No changes to medical history since previous evaluation  Allergy:   Allergies   No Known Allergies              PMHx:   Medical History        Past Medical History:   Diagnosis Date    Coronary artery disease      Disease of thyroid gland      Elevated cholesterol      GERD (gastroesophageal reflux disease)      Hypertension      Spinal headache                 Past Surgical History:  Surgical History         Past Surgical History:   Procedure Laterality Date    BREAST SURGERY        COLONOSCOPY        ENDOSCOPY        EXTRACORPOREAL SHOCK WAVE LITHOTRIPSY (ESWL) Right 11/1/2024     Procedure: EXTRACORPOREAL SHOCKWAVE LITHOTRIPSY;  Surgeon: Jonathan Saini MD;  Location: [facility];  Service: Urology;  Laterality: Right;    FRACTURE SURGERY        JOINT REPLACEMENT        SKIN BIOPSY                   Family History:         Family History   Problem Relation Age of Onset    Cancer Mother      Cancer Father              Social History:  Social History   Social History            Socioeconomic History    Marital status:    Tobacco Use    Smoking status: Never       Passive exposure: Never    Smokeless tobacco: Never   Vaping Use    Vaping status: Never Used   Substance and Sexual Activity    Alcohol use: Defer    Drug use: Defer    Sexual  "activity: Defer                  Review of Systems                14 pt ROS negative except for what is noted in HPI        Objective  Physical Exam:       Physical Exam:        Constitution: Ht 154.2 cm (60.71\")   Wt 64 kg (141 lb)   BMI 26.90 kg/m²  . No acute distress.   Head: Normocephalic, atraumatic.   Eyes: Aligned without strabismus. Conjunctivae noninjected   Ears, Nose, Mouth: . No lesions appreciated  Respiratory: Symmetric chest expansion. No respiratory distress.   Gastrointestinal:  soft, nondistended, nontender  Skin:  No cyanosis, clubbing or edema bilaterally   Lymphatics: No abnormal cervical or supraclavicular lymphadenopathy appreciated   Neurologic: No gross deficits. Alert and oriented x3   Psychiatric: normal mood         Results Review: I have personally reviewed all of the recent lab and imaging results available at this time.               Assessment & Plan  Assessment and Plan:  69 y.o.  female with chronic constipation, history of hiatal hernia and minimally symptomatic GERD    To endoscopy for EGD and colonoscopy  "

## 2025-07-11 NOTE — ANESTHESIA PREPROCEDURE EVALUATION
Anesthesia Evaluation     Patient summary reviewed and Nursing notes reviewed   no history of anesthetic complications:   NPO Solid Status: > 8 hours  NPO Liquid Status: > 8 hours           Airway   Mallampati: II  TM distance: >3 FB  Neck ROM: full  No difficulty expected  Dental - normal exam     Pulmonary - negative pulmonary ROS and normal exam   Cardiovascular - normal exam  Exercise tolerance: good (4-7 METS)    Rhythm: regular  Rate: normal    (+) hypertension, CAD, dysrhythmias PAC, hyperlipidemia      Neuro/Psych  (+) headaches  GI/Hepatic/Renal/Endo    (+) hiatal hernia, GERD, renal disease- stones, thyroid problem hypothyroidism    Musculoskeletal     Abdominal  - normal exam    Abdomen: soft.  Bowel sounds: normal.   Substance History - negative use     OB/GYN    (-) history of pregnancy induced hypertension        Other - negative ROS       ROS/Med Hx Other:   Normal sinus rhythm  Normal ECG  No previous ECGs available                     Anesthesia Plan    ASA 2     general     intravenous induction     Anesthetic plan, risks, benefits, and alternatives have been provided, discussed and informed consent has been obtained with: patient.  Pre-procedure education provided  Use of blood products discussed with  Consented to blood products.    Plan discussed with CRNA.    CODE STATUS:

## 2025-07-14 LAB — REF LAB TEST METHOD: NORMAL

## 2025-08-07 DIAGNOSIS — K21.00 GASTROESOPHAGEAL REFLUX DISEASE WITH ESOPHAGITIS WITHOUT HEMORRHAGE: Primary | ICD-10-CM

## 2025-08-07 RX ORDER — OMEPRAZOLE 40 MG/1
40 CAPSULE, DELAYED RELEASE ORAL DAILY
Qty: 60 CAPSULE | Refills: 5 | Status: SHIPPED | OUTPATIENT
Start: 2025-08-07

## (undated) DEVICE — CONN Y IRR DISP 1P/U

## (undated) DEVICE — DEFENDO AIR WATER SUCTION AND BIOPSY VALVE KIT FOR  OLYMPUS: Brand: DEFENDO AIR/WATER/SUCTION AND BIOPSY VALVE

## (undated) DEVICE — FRCP BX RADJAW4 NDL 2.8 240CM LG OG BX40

## (undated) DEVICE — Device

## (undated) DEVICE — THE BITE BLOCK MAXI, LATEX FREE STRAP IS USED TO PROTECT THE ENDOSCOPE INSERTION TUBE FROM BEING BITTEN BY THE PATIENT.

## (undated) DEVICE — ENDOGATOR AUXILIARY WATER JET CONNECTOR: Brand: ENDOGATOR